# Patient Record
Sex: MALE | Race: WHITE | NOT HISPANIC OR LATINO | URBAN - METROPOLITAN AREA
[De-identification: names, ages, dates, MRNs, and addresses within clinical notes are randomized per-mention and may not be internally consistent; named-entity substitution may affect disease eponyms.]

---

## 2021-03-11 ENCOUNTER — CLAIMS CREATED FROM THE CLAIM WINDOW (OUTPATIENT)
Dept: URBAN - METROPOLITAN AREA CLINIC 92 | Facility: CLINIC | Age: 61
End: 2021-03-11
Payer: COMMERCIAL

## 2021-03-11 VITALS
WEIGHT: 172 LBS | DIASTOLIC BLOOD PRESSURE: 81 MMHG | BODY MASS INDEX: 25.48 KG/M2 | TEMPERATURE: 95.1 F | HEART RATE: 57 BPM | SYSTOLIC BLOOD PRESSURE: 134 MMHG | HEIGHT: 69 IN

## 2021-03-11 DIAGNOSIS — Z12.11 SCREEN FOR COLON CANCER: ICD-10-CM

## 2021-03-11 PROCEDURE — 99242 OFF/OP CONSLTJ NEW/EST SF 20: CPT | Performed by: INTERNAL MEDICINE

## 2021-03-11 PROCEDURE — 993 AGA: Performed by: INTERNAL MEDICINE

## 2021-03-11 RX ORDER — SODIUM, POTASSIUM,MAG SULFATES 17.5-3.13G
354ML SOLUTION, RECONSTITUTED, ORAL ORAL
Qty: 354 MILLILITER | Refills: 0 | OUTPATIENT
Start: 2021-03-10 | End: 2021-03-11

## 2021-03-11 NOTE — EXAM-PHYSICAL EXAM
HEENT: NC/AT, pupils nondilated, trachea midline Neck: supple, NT, no LAD Chest: Symmetrical, no labored respirations, no audible wheezing Heart: Regular rate and rhythm Abd: Soft NT/ND, no organomegaly, distention, masses Ext: no c/c/e Neuro: no focal defects, A and O

## 2021-03-11 NOTE — HPI-OTHER HISTORIES
Wt decr 24# over 10y (was 196)  Patient was referred by Dr. Menjivar for an evaluation of screening.  A copy of this note will be sent to the referring provider   Denies abd pain N/V diarrhea constipation hematochezia melena jaundice unintentional wt loss   Denies dyspepsia htbrn dysphagia odynophagia food impaction CP cough anorexia light headedness   Denies scleral icterus, increased abd girth, LE edema, confusion, disorientation, memory loss, hematemesis  Prior colon 10y ago Dr Chaudhry WNL

## 2021-12-08 ENCOUNTER — WEB ENCOUNTER (OUTPATIENT)
Dept: URBAN - METROPOLITAN AREA CLINIC 92 | Facility: CLINIC | Age: 61
End: 2021-12-08

## 2021-12-08 ENCOUNTER — OFFICE VISIT (OUTPATIENT)
Dept: URBAN - METROPOLITAN AREA CLINIC 92 | Facility: CLINIC | Age: 61
End: 2021-12-08
Payer: COMMERCIAL

## 2021-12-08 DIAGNOSIS — D69.6 THROMBOCYTOPENIA: ICD-10-CM

## 2021-12-08 DIAGNOSIS — R93.5 ABNORMAL MRI OF ABDOMEN: ICD-10-CM

## 2021-12-08 DIAGNOSIS — I85.00 ESOPHAGEAL VARICES WITHOUT BLEEDING, UNSPECIFIED ESOPHAGEAL VARICES TYPE: ICD-10-CM

## 2021-12-08 DIAGNOSIS — R19.5 OCCULT BLOOD POSITIVE STOOL: ICD-10-CM

## 2021-12-08 PROCEDURE — 99214 OFFICE O/P EST MOD 30 MIN: CPT | Performed by: PHYSICIAN ASSISTANT
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
## 2021-12-09 ENCOUNTER — OFFICE VISIT (OUTPATIENT)
Dept: URBAN - METROPOLITAN AREA CLINIC 86 | Facility: CLINIC | Age: 61
End: 2021-12-09
Payer: COMMERCIAL

## 2021-12-09 VITALS
TEMPERATURE: 97.5 F | HEART RATE: 70 BPM | SYSTOLIC BLOOD PRESSURE: 139 MMHG | HEIGHT: 69 IN | DIASTOLIC BLOOD PRESSURE: 87 MMHG | BODY MASS INDEX: 26 KG/M2 | WEIGHT: 175.5 LBS

## 2021-12-09 DIAGNOSIS — R93.5 ABNORMAL MRI OF ABDOMEN: ICD-10-CM

## 2021-12-09 DIAGNOSIS — R76.8 HEPATITIS B CORE ANTIBODY POSITIVE: ICD-10-CM

## 2021-12-09 DIAGNOSIS — D69.6 THROMBOCYTOPENIA: ICD-10-CM

## 2021-12-09 DIAGNOSIS — R74.8 ELEVATED LIVER ENZYMES: ICD-10-CM

## 2021-12-09 PROCEDURE — 99214 OFFICE O/P EST MOD 30 MIN: CPT | Performed by: PHYSICIAN ASSISTANT

## 2021-12-09 RX ORDER — VITAMIN A 2400 MCG
AS DIRECTED CAPSULE ORAL ONCE A DAY
Status: ACTIVE | COMMUNITY

## 2021-12-09 RX ORDER — OMEPRAZOLE 40 MG/1
1 CAPSULE 30 MINUTES BEFORE MORNING MEAL CAPSULE, DELAYED RELEASE ORAL ONCE A DAY
Status: ACTIVE | COMMUNITY

## 2021-12-09 RX ORDER — MELOXICAM 7.5 MG/1
1 TABLET TABLET ORAL ONCE A DAY
Status: ACTIVE | COMMUNITY

## 2021-12-09 RX ORDER — PREGABALIN 75 MG/1
1 CAPSULE CAPSULE ORAL TWICE A DAY
Status: ACTIVE | COMMUNITY

## 2021-12-09 RX ORDER — ACYCLOVIR 200 MG/1
1 CAPSULE CAPSULE ORAL TWICE A DAY
Status: ACTIVE | COMMUNITY

## 2021-12-09 RX ORDER — IMIQUIMOD 50 MG/G
1 APPLICATION AT BEDTIME, LEAVE ON FOR 8 HOURS THEN WASH OFF CREAM TOPICAL
Status: ACTIVE | COMMUNITY

## 2021-12-09 RX ORDER — PRAVASTATIN SODIUM 40 MG/1
1 TABLET TABLET ORAL ONCE A DAY
Status: ACTIVE | COMMUNITY

## 2021-12-09 RX ORDER — ATOVAQUONE 750 MG/5ML
5 ML WITH FOOD SUSPENSION ORAL DAILY
Status: ACTIVE | COMMUNITY

## 2021-12-09 RX ORDER — LOPERAMIDE HYDROCHLORIDE 2 MG/1
1 TABLET AS NEEDED CAPSULE ORAL
Status: ACTIVE | COMMUNITY

## 2021-12-09 RX ORDER — DEXTROSE 4 G
1 TABLET TABLET,CHEWABLE ORAL ONCE A DAY
Status: ACTIVE | COMMUNITY

## 2021-12-09 RX ORDER — DRONABINOL 2.5 MG/1
1 CAPSULE IN THE EVENING OR AT BEDTIME CAPSULE ORAL
Status: ACTIVE | COMMUNITY

## 2021-12-09 RX ORDER — BICTEGRAVIR SODIUM, EMTRICITABINE, AND TENOFOVIR ALAFENAMIDE FUMARATE 50; 200; 25 MG/1; MG/1; MG/1
1 TABLET TABLET ORAL ONCE A DAY
Status: ACTIVE | COMMUNITY

## 2021-12-09 RX ORDER — LORATADINE 10 MG/1
1 TABLET TABLET ORAL ONCE A DAY
Status: ACTIVE | COMMUNITY
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
## 2021-12-16 LAB
A/G RATIO: 1.6
ACTIN (SMOOTH MUSCLE) ANTIBODY: 33
ALBUMIN: 4.7
ALKALINE PHOSPHATASE: 78
ALPHA-1-ANTITRYPSIN, SERUM: 141
ALT (SGPT): 37
ANA DIRECT: NEGATIVE
AST (SGOT): 42
BASO (ABSOLUTE): 0
BASOS: 1
BILIRUBIN, TOTAL: 0.7
BUN/CREATININE RATIO: 24
BUN: 18
CALCIUM: 9.5
CARBON DIOXIDE, TOTAL: 25
CHLORIDE: 104
CREATININE: 0.75
EGFR IF AFRICN AM: 114
EGFR IF NONAFRICN AM: 99
EOS (ABSOLUTE): 0.2
EOS: 4
FERRITIN, SERUM: 73
GLOBULIN, TOTAL: 2.9
GLUCOSE: 86
HBSAG SCREEN: NEGATIVE
HBV LOG10: (no result)
HCV AB: <0.1
HEMATOCRIT: 45.6
HEMATOLOGY COMMENTS:: (no result)
HEMOGLOBIN: 15.4
HEP A AB, TOTAL: NEGATIVE
HEP B CORE AB, TOT: POSITIVE
HEP BE AB: POSITIVE
HEP BE AG: NEGATIVE
HEPATITIS B QUANTITATION: (no result)
HEPATITIS B SURF AB QUANT: 151
IMMATURE CELLS: (no result)
IMMATURE GRANS (ABS): 0
IMMATURE GRANULOCYTES: 0
INR: 1.1
INTERPRETATION:: (no result)
IRON BIND.CAP.(TIBC): 404
IRON SATURATION: 39
IRON: 158
LYMPHS (ABSOLUTE): 1.1
LYMPHS: 23
MCH: 30.7
MCHC: 33.8
MCV: 91
MITOCHONDRIAL (M2) ANTIBODY: <20
MONOCYTES(ABSOLUTE): 0.5
MONOCYTES: 10
NEUTROPHILS (ABSOLUTE): 2.9
NEUTROPHILS: 62
NRBC: (no result)
PHENOTYPE (PI): (no result)
PLATELETS: 77
POTASSIUM: 4.3
PROTEIN, TOTAL: 7.6
PROTHROMBIN TIME: 11.5
RBC: 5.01
RDW: 13
SODIUM: 142
TEST INFORMATION:: (no result)
UIBC: 246
WBC: 4.7

## 2021-12-20 ENCOUNTER — TELEPHONE ENCOUNTER (OUTPATIENT)
Dept: URBAN - METROPOLITAN AREA CLINIC 92 | Facility: CLINIC | Age: 61
End: 2021-12-20

## 2021-12-20 NOTE — HPI-TODAY'S VISIT:
labs 12/9/21- hepatitis c negative, iron labs are normal, ferritin 72 normal, hepatitis b surface antibody 151 (consistent with immunity to hepatits b), hepatitis be antigen negative, hepatitis be ab positive, asma 33 elevated (would like to check igg and igm for further evaluation, sometimes can be falsely elevated in setting of fatty liver), ama negative, alpha 1 141 with normal phenotype MM, glucose 86 normal, creatinine 0.75, sodium 142 normal, alkaline phosphatase 78 normal, AST 42 high, ALT 37 normal however ideal is less than 35, HEATHER negative, hepatitis b DNA not detected, wbc 4.7 normal, hemoglobin 15.4 normal, platelets 77 low however may be higher due to clumping of sample, inr 1.1, hepatitis b core ab total positive, hepatitis a ab total negative (recommend hepatitis a vaccine as you are not immune), hepatitis b surf ag negative

## 2021-12-26 LAB
IMMUNOGLOBULIN G, QN, SERUM: 1407
IMMUNOGLOBULIN M, QN, SERUM: 259

## 2022-01-17 ENCOUNTER — LAB OUTSIDE AN ENCOUNTER (OUTPATIENT)
Dept: URBAN - METROPOLITAN AREA CLINIC 92 | Facility: CLINIC | Age: 62
End: 2022-01-17

## 2022-01-18 LAB
BASO (ABSOLUTE): 0.1
BASOS: 1
EOS (ABSOLUTE): 0.2
EOS: 4
HEMATOCRIT: 45.5
HEMATOLOGY COMMENTS:: (no result)
HEMOGLOBIN: 16.5
IMMATURE CELLS: (no result)
IMMATURE GRANS (ABS): 0
IMMATURE GRANULOCYTES: 0
LYMPHS (ABSOLUTE): 1.2
LYMPHS: 25
MCH: 33.2
MCHC: 36.3
MCV: 92
MONOCYTES(ABSOLUTE): 0.4
MONOCYTES: 9
NEUTROPHILS (ABSOLUTE): 2.9
NEUTROPHILS: 61
NRBC: (no result)
PLATELETS: 72
RBC: 4.97
RDW: 13.4
WBC: 4.9

## 2022-01-25 ENCOUNTER — OFFICE VISIT (OUTPATIENT)
Dept: URBAN - METROPOLITAN AREA SURGERY CENTER 16 | Facility: SURGERY CENTER | Age: 62
End: 2022-01-25
Payer: COMMERCIAL

## 2022-01-25 DIAGNOSIS — I85.10 ESOPH VARICE OTHER DIS: ICD-10-CM

## 2022-01-25 DIAGNOSIS — K74.69 CIRRHOSIS, CRYPTOGENIC: ICD-10-CM

## 2022-01-25 DIAGNOSIS — K29.60 ADENOPAPILLOMATOSIS GASTRICA: ICD-10-CM

## 2022-01-25 DIAGNOSIS — K31.7 BENIGN GASTRIC POLYP: ICD-10-CM

## 2022-01-25 PROCEDURE — G8907 PT DOC NO EVENTS ON DISCHARG: HCPCS | Performed by: INTERNAL MEDICINE

## 2022-01-25 PROCEDURE — G0121 COLON CA SCRN NOT HI RSK IND: HCPCS | Performed by: INTERNAL MEDICINE

## 2022-01-25 PROCEDURE — 43239 EGD BIOPSY SINGLE/MULTIPLE: CPT | Performed by: INTERNAL MEDICINE

## 2022-01-25 RX ORDER — PREGABALIN 75 MG/1
1 CAPSULE CAPSULE ORAL TWICE A DAY
Status: ACTIVE | COMMUNITY

## 2022-01-25 RX ORDER — IMIQUIMOD 50 MG/G
1 APPLICATION AT BEDTIME, LEAVE ON FOR 8 HOURS THEN WASH OFF CREAM TOPICAL
Status: ACTIVE | COMMUNITY

## 2022-01-25 RX ORDER — LOPERAMIDE HYDROCHLORIDE 2 MG/1
1 TABLET AS NEEDED CAPSULE ORAL
Status: ACTIVE | COMMUNITY

## 2022-01-25 RX ORDER — ACYCLOVIR 200 MG/1
1 CAPSULE CAPSULE ORAL TWICE A DAY
Status: ACTIVE | COMMUNITY

## 2022-01-25 RX ORDER — MELOXICAM 7.5 MG/1
1 TABLET TABLET ORAL ONCE A DAY
Status: ACTIVE | COMMUNITY

## 2022-01-25 RX ORDER — DEXTROSE 4 G
1 TABLET TABLET,CHEWABLE ORAL ONCE A DAY
Status: ACTIVE | COMMUNITY

## 2022-01-25 RX ORDER — PRAVASTATIN SODIUM 40 MG/1
1 TABLET TABLET ORAL ONCE A DAY
Status: ACTIVE | COMMUNITY

## 2022-01-25 RX ORDER — VITAMIN A 2400 MCG
AS DIRECTED CAPSULE ORAL ONCE A DAY
Status: ACTIVE | COMMUNITY

## 2022-01-25 RX ORDER — DRONABINOL 2.5 MG/1
1 CAPSULE IN THE EVENING OR AT BEDTIME CAPSULE ORAL
Status: ACTIVE | COMMUNITY

## 2022-01-25 RX ORDER — BICTEGRAVIR SODIUM, EMTRICITABINE, AND TENOFOVIR ALAFENAMIDE FUMARATE 50; 200; 25 MG/1; MG/1; MG/1
1 TABLET TABLET ORAL ONCE A DAY
Status: ACTIVE | COMMUNITY

## 2022-01-25 RX ORDER — ATOVAQUONE 750 MG/5ML
5 ML WITH FOOD SUSPENSION ORAL DAILY
Status: ACTIVE | COMMUNITY

## 2022-01-25 RX ORDER — LORATADINE 10 MG/1
1 TABLET TABLET ORAL ONCE A DAY
Status: ACTIVE | COMMUNITY

## 2022-01-25 RX ORDER — OMEPRAZOLE 40 MG/1
1 CAPSULE 30 MINUTES BEFORE MORNING MEAL CAPSULE, DELAYED RELEASE ORAL ONCE A DAY
Status: ACTIVE | COMMUNITY

## 2022-01-27 ENCOUNTER — OFFICE VISIT (OUTPATIENT)
Dept: URBAN - METROPOLITAN AREA TELEHEALTH 2 | Facility: TELEHEALTH | Age: 62
End: 2022-01-27
Payer: COMMERCIAL

## 2022-01-27 VITALS — BODY MASS INDEX: 25.18 KG/M2 | WEIGHT: 170 LBS | HEIGHT: 69 IN

## 2022-01-27 DIAGNOSIS — R93.5 ABNORMAL MRI OF ABDOMEN: ICD-10-CM

## 2022-01-27 DIAGNOSIS — R74.8 ELEVATED LIVER ENZYMES: ICD-10-CM

## 2022-01-27 DIAGNOSIS — K76.6 PORTAL HYPERTENSION: ICD-10-CM

## 2022-01-27 DIAGNOSIS — K74.60 CIRRHOSIS OF LIVER WITHOUT ASCITES, UNSPECIFIED HEPATIC CIRRHOSIS TYPE: ICD-10-CM

## 2022-01-27 PROCEDURE — 99214 OFFICE O/P EST MOD 30 MIN: CPT | Performed by: PHYSICIAN ASSISTANT

## 2022-01-27 RX ORDER — ACYCLOVIR 200 MG/1
1 CAPSULE CAPSULE ORAL TWICE A DAY
Status: ACTIVE | COMMUNITY

## 2022-01-27 RX ORDER — PRAVASTATIN SODIUM 40 MG/1
1 TABLET TABLET ORAL ONCE A DAY
Status: ACTIVE | COMMUNITY

## 2022-01-27 RX ORDER — VITAMIN A 2400 MCG
AS DIRECTED CAPSULE ORAL ONCE A DAY
Status: ACTIVE | COMMUNITY

## 2022-01-27 RX ORDER — LORATADINE 10 MG/1
1 TABLET TABLET ORAL ONCE A DAY
Status: ACTIVE | COMMUNITY

## 2022-01-27 RX ORDER — DRONABINOL 2.5 MG/1
1 CAPSULE IN THE EVENING OR AT BEDTIME CAPSULE ORAL
Status: ACTIVE | COMMUNITY

## 2022-01-27 RX ORDER — DEXTROSE 4 G
1 TABLET TABLET,CHEWABLE ORAL ONCE A DAY
Status: ACTIVE | COMMUNITY

## 2022-01-27 RX ORDER — BICTEGRAVIR SODIUM, EMTRICITABINE, AND TENOFOVIR ALAFENAMIDE FUMARATE 50; 200; 25 MG/1; MG/1; MG/1
1 TABLET TABLET ORAL ONCE A DAY
Status: ACTIVE | COMMUNITY

## 2022-01-27 RX ORDER — LOPERAMIDE HYDROCHLORIDE 2 MG/1
1 TABLET AS NEEDED CAPSULE ORAL
Status: ACTIVE | COMMUNITY

## 2022-01-27 RX ORDER — OMEPRAZOLE 40 MG/1
1 CAPSULE 30 MINUTES BEFORE MORNING MEAL CAPSULE, DELAYED RELEASE ORAL ONCE A DAY
Status: ACTIVE | COMMUNITY

## 2022-01-27 RX ORDER — MELOXICAM 7.5 MG/1
1 TABLET TABLET ORAL ONCE A DAY
Status: ACTIVE | COMMUNITY

## 2022-01-27 RX ORDER — PREGABALIN 75 MG/1
1 CAPSULE CAPSULE ORAL TWICE A DAY
Status: ACTIVE | COMMUNITY

## 2022-01-27 RX ORDER — IMIQUIMOD 50 MG/G
1 APPLICATION AT BEDTIME, LEAVE ON FOR 8 HOURS THEN WASH OFF CREAM TOPICAL
Status: ACTIVE | COMMUNITY

## 2022-01-27 RX ORDER — ATOVAQUONE 750 MG/5ML
5 ML WITH FOOD SUSPENSION ORAL DAILY
Status: ACTIVE | COMMUNITY
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
## 2022-02-22 ENCOUNTER — TELEPHONE ENCOUNTER (OUTPATIENT)
Dept: URBAN - METROPOLITAN AREA CLINIC 92 | Facility: CLINIC | Age: 62
End: 2022-02-22

## 2022-03-08 LAB
ALBUMIN: 4.3
ALKALINE PHOSPHATASE: 91
ALT (SGPT): 42
AST (SGOT): 55
BILIRUBIN, DIRECT: 0.17
BILIRUBIN, TOTAL: 0.6
PROTEIN, TOTAL: 7.3

## 2022-03-10 ENCOUNTER — LAB OUTSIDE AN ENCOUNTER (OUTPATIENT)
Dept: URBAN - METROPOLITAN AREA TELEHEALTH 2 | Facility: TELEHEALTH | Age: 62
End: 2022-03-10

## 2022-03-29 ENCOUNTER — TELEPHONE ENCOUNTER (OUTPATIENT)
Dept: URBAN - METROPOLITAN AREA CLINIC 92 | Facility: CLINIC | Age: 62
End: 2022-03-29

## 2022-03-29 LAB
A/G RATIO: 1.4
ALBUMIN: 4.3
ALKALINE PHOSPHATASE: 83
ALT (SGPT): 48
AST (SGOT): 46
BASO (ABSOLUTE): 0
BASOS: 1
BILIRUBIN, TOTAL: 0.7
BUN/CREATININE RATIO: 22
BUN: 16
CALCIUM: 9.3
CARBON DIOXIDE, TOTAL: 25
CHLORIDE: 100
CREATININE: 0.73
EGFR: 104
EOS (ABSOLUTE): 0.2
EOS: 3
GLOBULIN, TOTAL: 3
GLUCOSE: 91
HEMATOCRIT: 47.3
HEMATOLOGY COMMENTS:: (no result)
HEMOGLOBIN: 15.7
IMMATURE CELLS: (no result)
IMMATURE GRANS (ABS): 0
IMMATURE GRANULOCYTES: 0
IMMUNOGLOBULIN M, QN, SERUM: 247
INR: 1.1
LYMPHS (ABSOLUTE): 0.9
LYMPHS: 20
MCH: 31.2
MCHC: 33.2
MCV: 94
MONOCYTES(ABSOLUTE): 0.5
MONOCYTES: 11
NEUTROPHILS (ABSOLUTE): 2.8
NEUTROPHILS: 65
NRBC: (no result)
PLATELETS: 79
POTASSIUM: 4.5
PROTEIN, TOTAL: 7.3
PROTHROMBIN TIME: 11.6
RBC: 5.03
RDW: 13.4
SODIUM: 138
WBC: 4.4

## 2022-04-11 ENCOUNTER — OFFICE VISIT (OUTPATIENT)
Dept: URBAN - METROPOLITAN AREA MEDICAL CENTER 28 | Facility: MEDICAL CENTER | Age: 62
End: 2022-04-11
Payer: COMMERCIAL

## 2022-04-11 DIAGNOSIS — K31.89 ACQUIRED DEFORMITY OF DUODENUM: ICD-10-CM

## 2022-04-11 DIAGNOSIS — K31.7 BENIGN GASTRIC POLYP: ICD-10-CM

## 2022-04-11 DIAGNOSIS — I85.00 ESOPHAGEAL  VARICOSE VEINS: ICD-10-CM

## 2022-04-11 PROCEDURE — 43251 EGD REMOVE LESION SNARE: CPT | Performed by: INTERNAL MEDICINE

## 2022-04-11 RX ORDER — VITAMIN A 2400 MCG
AS DIRECTED CAPSULE ORAL ONCE A DAY
Status: ACTIVE | COMMUNITY

## 2022-04-11 RX ORDER — ATOVAQUONE 750 MG/5ML
5 ML WITH FOOD SUSPENSION ORAL DAILY
Status: ACTIVE | COMMUNITY

## 2022-04-11 RX ORDER — BICTEGRAVIR SODIUM, EMTRICITABINE, AND TENOFOVIR ALAFENAMIDE FUMARATE 50; 200; 25 MG/1; MG/1; MG/1
1 TABLET TABLET ORAL ONCE A DAY
Status: ACTIVE | COMMUNITY

## 2022-04-11 RX ORDER — LOPERAMIDE HYDROCHLORIDE 2 MG/1
1 TABLET AS NEEDED CAPSULE ORAL
Status: ACTIVE | COMMUNITY

## 2022-04-11 RX ORDER — DEXTROSE 4 G
1 TABLET TABLET,CHEWABLE ORAL ONCE A DAY
Status: ACTIVE | COMMUNITY

## 2022-04-11 RX ORDER — MELOXICAM 7.5 MG/1
1 TABLET TABLET ORAL ONCE A DAY
Status: ACTIVE | COMMUNITY

## 2022-04-11 RX ORDER — PREGABALIN 75 MG/1
1 CAPSULE CAPSULE ORAL TWICE A DAY
Status: ACTIVE | COMMUNITY

## 2022-04-11 RX ORDER — ACYCLOVIR 200 MG/1
1 CAPSULE CAPSULE ORAL TWICE A DAY
Status: ACTIVE | COMMUNITY

## 2022-04-11 RX ORDER — PRAVASTATIN SODIUM 40 MG/1
1 TABLET TABLET ORAL ONCE A DAY
Status: ACTIVE | COMMUNITY

## 2022-04-11 RX ORDER — LORATADINE 10 MG/1
1 TABLET TABLET ORAL ONCE A DAY
Status: ACTIVE | COMMUNITY

## 2022-04-11 RX ORDER — OMEPRAZOLE 40 MG/1
1 CAPSULE 30 MINUTES BEFORE MORNING MEAL CAPSULE, DELAYED RELEASE ORAL ONCE A DAY
Status: ACTIVE | COMMUNITY

## 2022-04-11 RX ORDER — IMIQUIMOD 50 MG/G
1 APPLICATION AT BEDTIME, LEAVE ON FOR 8 HOURS THEN WASH OFF CREAM TOPICAL
Status: ACTIVE | COMMUNITY

## 2022-04-11 RX ORDER — DRONABINOL 2.5 MG/1
1 CAPSULE IN THE EVENING OR AT BEDTIME CAPSULE ORAL
Status: ACTIVE | COMMUNITY

## 2022-04-25 ENCOUNTER — OFFICE VISIT (OUTPATIENT)
Dept: URBAN - METROPOLITAN AREA TELEHEALTH 2 | Facility: TELEHEALTH | Age: 62
End: 2022-04-25
Payer: COMMERCIAL

## 2022-04-25 DIAGNOSIS — B20 HIV INFECTION, UNSPECIFIED SYMPTOM STATUS: ICD-10-CM

## 2022-04-25 DIAGNOSIS — F32.A DEPRESSION, UNSPECIFIED: ICD-10-CM

## 2022-04-25 DIAGNOSIS — K74.69 OTHER CIRRHOSIS OF LIVER: ICD-10-CM

## 2022-04-25 DIAGNOSIS — M81.0 OSTEOPOROSIS WITHOUT CURRENT PATHOLOGICAL FRACTURE, UNSPECIFIED OSTEOPOROSIS TYPE: ICD-10-CM

## 2022-04-25 DIAGNOSIS — F41.9 ANXIETY DISORDER, UNSPECIFIED: ICD-10-CM

## 2022-04-25 DIAGNOSIS — R19.5 OCCULT BLOOD POSITIVE STOOL: ICD-10-CM

## 2022-04-25 DIAGNOSIS — K76.6 PORTAL HYPERTENSION: ICD-10-CM

## 2022-04-25 DIAGNOSIS — D69.6 THROMBOCYTOPENIA: ICD-10-CM

## 2022-04-25 DIAGNOSIS — R93.5 ABNORMAL MRI OF ABDOMEN: ICD-10-CM

## 2022-04-25 DIAGNOSIS — E78.2 MIXED HYPERLIPIDEMIA: ICD-10-CM

## 2022-04-25 DIAGNOSIS — I85.10 SECONDARY ESOPHAGEAL VARICES WITHOUT BLEEDING: ICD-10-CM

## 2022-04-25 PROCEDURE — 99214 OFFICE O/P EST MOD 30 MIN: CPT

## 2022-04-25 RX ORDER — VITAMIN A 2400 MCG
AS DIRECTED CAPSULE ORAL ONCE A DAY
Status: ACTIVE | COMMUNITY

## 2022-04-25 RX ORDER — IMIQUIMOD 50 MG/G
1 APPLICATION AT BEDTIME, LEAVE ON FOR 8 HOURS THEN WASH OFF CREAM TOPICAL
Status: ACTIVE | COMMUNITY

## 2022-04-25 RX ORDER — PRAVASTATIN SODIUM 40 MG/1
1 TABLET TABLET ORAL ONCE A DAY
Status: ACTIVE | COMMUNITY

## 2022-04-25 RX ORDER — DEXTROSE 4 G
1 TABLET TABLET,CHEWABLE ORAL ONCE A DAY
Status: ACTIVE | COMMUNITY

## 2022-04-25 RX ORDER — ACYCLOVIR 200 MG/1
1 CAPSULE CAPSULE ORAL TWICE A DAY
Status: ACTIVE | COMMUNITY

## 2022-04-25 RX ORDER — OMEPRAZOLE 40 MG/1
1 CAPSULE 30 MINUTES BEFORE MORNING MEAL CAPSULE, DELAYED RELEASE ORAL ONCE A DAY
Status: ACTIVE | COMMUNITY

## 2022-04-25 RX ORDER — DRONABINOL 2.5 MG/1
1 CAPSULE IN THE EVENING OR AT BEDTIME CAPSULE ORAL
Status: ACTIVE | COMMUNITY

## 2022-04-25 RX ORDER — ATOVAQUONE 750 MG/5ML
5 ML WITH FOOD SUSPENSION ORAL DAILY
Status: DISCONTINUED | COMMUNITY

## 2022-04-25 RX ORDER — LOPERAMIDE HYDROCHLORIDE 2 MG/1
1 TABLET AS NEEDED CAPSULE ORAL
Status: ACTIVE | COMMUNITY

## 2022-04-25 RX ORDER — PREGABALIN 75 MG/1
1 CAPSULE CAPSULE ORAL TWICE A DAY
Status: ACTIVE | COMMUNITY

## 2022-04-25 RX ORDER — BICTEGRAVIR SODIUM, EMTRICITABINE, AND TENOFOVIR ALAFENAMIDE FUMARATE 50; 200; 25 MG/1; MG/1; MG/1
1 TABLET TABLET ORAL ONCE A DAY
Status: ACTIVE | COMMUNITY

## 2022-04-25 RX ORDER — LORATADINE 10 MG/1
1 TABLET TABLET ORAL ONCE A DAY
Status: ACTIVE | COMMUNITY

## 2022-04-25 RX ORDER — MELOXICAM 7.5 MG/1
1 TABLET TABLET ORAL ONCE A DAY
Status: DISCONTINUED | COMMUNITY
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
## 2022-04-27 ENCOUNTER — LAB OUTSIDE AN ENCOUNTER (OUTPATIENT)
Dept: URBAN - METROPOLITAN AREA TELEHEALTH 2 | Facility: TELEHEALTH | Age: 62
End: 2022-04-27

## 2022-06-01 ENCOUNTER — LAB OUTSIDE AN ENCOUNTER (OUTPATIENT)
Dept: URBAN - METROPOLITAN AREA TELEHEALTH 2 | Facility: TELEHEALTH | Age: 62
End: 2022-06-01

## 2022-06-02 ENCOUNTER — TELEPHONE ENCOUNTER (OUTPATIENT)
Dept: URBAN - METROPOLITAN AREA CLINIC 92 | Facility: CLINIC | Age: 62
End: 2022-06-02

## 2022-06-02 ENCOUNTER — WEB ENCOUNTER (OUTPATIENT)
Dept: URBAN - METROPOLITAN AREA CLINIC 86 | Facility: CLINIC | Age: 62
End: 2022-06-02

## 2022-06-02 ENCOUNTER — OFFICE VISIT (OUTPATIENT)
Dept: URBAN - METROPOLITAN AREA TELEHEALTH 2 | Facility: TELEHEALTH | Age: 62
End: 2022-06-02

## 2022-06-02 LAB
A/G RATIO: 1.4
ALBUMIN: 4.3
ALKALINE PHOSPHATASE: 109
ALT (SGPT): 41
AST (SGOT): 44
BASO (ABSOLUTE): (no result)
BASOS: (no result)
BILIRUBIN, TOTAL: 0.4
BUN/CREATININE RATIO: 13
BUN: 10
CALCIUM: 9.2
CARBON DIOXIDE, TOTAL: 26
CHLORIDE: 100
CREATININE: 0.77
EGFR: 102
EOS (ABSOLUTE): (no result)
EOS: (no result)
GLOBULIN, TOTAL: 3
GLUCOSE: 117
HEMATOCRIT: (no result)
HEMATOLOGY COMMENTS:: (no result)
HEMOGLOBIN: (no result)
IMMATURE CELLS: (no result)
IMMATURE GRANS (ABS): (no result)
IMMATURE GRANULOCYTES: (no result)
INR: 1.1
LYMPHS (ABSOLUTE): (no result)
LYMPHS: (no result)
MCH: (no result)
MCHC: (no result)
MCV: (no result)
MONOCYTES(ABSOLUTE): (no result)
MONOCYTES: (no result)
NEUTROPHILS (ABSOLUTE): (no result)
NEUTROPHILS: (no result)
NRBC: (no result)
PLATELETS: (no result)
POTASSIUM: 5
PROTEIN, TOTAL: 7.3
PROTHROMBIN TIME: 11.4
RBC: (no result)
RDW: (no result)
REQUEST PROBLEM: (no result)
SODIUM: 140
WBC: (no result)

## 2022-06-02 NOTE — HPI-TODAY'S VISIT:
Dear Seth Wilkerson, June 2022 labs show that the CBC was clumped and so not able to be run. The CMP was able to be run and showed that your sugar was up at 117 and maybe your were not fasting?  Please share with primary provider. BUN of 10 creatinine 0.77 sodium 140 potassium 5.0 albumin 4.3 bilirubin 0.4 alkaline phosphatase 109 AST 44 and ALT 41.  Previously AST 46 and ALT 48 so a little lower but ideal ALT is less than 35 so still a bit off. INR 1.1 which is stable. Dr Dominguez

## 2022-06-03 ENCOUNTER — LAB OUTSIDE AN ENCOUNTER (OUTPATIENT)
Dept: URBAN - METROPOLITAN AREA CLINIC 86 | Facility: CLINIC | Age: 62
End: 2022-06-03

## 2022-06-06 ENCOUNTER — TELEPHONE ENCOUNTER (OUTPATIENT)
Dept: URBAN - METROPOLITAN AREA CLINIC 92 | Facility: CLINIC | Age: 62
End: 2022-06-06

## 2022-06-06 ENCOUNTER — LAB OUTSIDE AN ENCOUNTER (OUTPATIENT)
Dept: URBAN - METROPOLITAN AREA TELEHEALTH 2 | Facility: TELEHEALTH | Age: 62
End: 2022-06-06

## 2022-06-06 NOTE — HPI-TODAY'S VISIT:
Tricia Wilkerson, June 6 ultrasound shows liver to be mildly coarsened with regards to the echotexture but with no lesions. Bile duct showed no dilated bile ducts and common duct was 3 mm. Gallbladder was normal. Pancreas was obscured by overlying structures. Right kidney 12.3 cm and left kidney 13.6 cm.  No hydronephrosis. Spleen enlarged at 16.7 cm. Liver vessels patent. Prior scan was an MRI from November 30 of last year that showed that the liver had mild morphologic changes of chronic liver disease with fissural widening and minimal nodular contour and no suspicious lesions.  Spleen was enlarged and also at 16 cm.  Small esophageal and perigastric varices were seen. This reading would be generally in keeping with that reports findings. Dr Dominguez

## 2022-06-06 NOTE — HPI-TODAY'S VISIT:
Dear Seth Wilkerson, June 6 [place] labs sent in: na 140 and k 4.8 and cl 101 and co2 29 and glu 203 elevated and please discuss with local provider. TB 0.7 and alb 4.2 and ca 9.2. cr 0.72. Ast 37 and alt 38 (ideal alt less than 35). Alk 86.   Wbc 3.6 little low and hct 46.3 and plat 72 low. Prior other labs: cmp showed glucose 117 also up. Ast 44 and alt 41 so little lower now. Cbc not run and inr 1.1. Meld using the labs was 7 and meld na 7 so remains low. Dr Dominguez

## 2022-07-22 ENCOUNTER — OFFICE VISIT (OUTPATIENT)
Dept: URBAN - METROPOLITAN AREA TELEHEALTH 2 | Facility: TELEHEALTH | Age: 62
End: 2022-07-22
Payer: COMMERCIAL

## 2022-07-22 VITALS — WEIGHT: 175 LBS | HEIGHT: 69 IN | BODY MASS INDEX: 25.92 KG/M2

## 2022-07-22 DIAGNOSIS — K76.6 PORTAL HYPERTENSION: ICD-10-CM

## 2022-07-22 DIAGNOSIS — R93.5 ABNORMAL MRI OF ABDOMEN: ICD-10-CM

## 2022-07-22 DIAGNOSIS — E78.2 MIXED HYPERLIPIDEMIA: ICD-10-CM

## 2022-07-22 DIAGNOSIS — I85.00 ESOPHAGEAL VARICES WITHOUT BLEEDING, UNSPECIFIED ESOPHAGEAL VARICES TYPE: ICD-10-CM

## 2022-07-22 DIAGNOSIS — R19.5 OCCULT BLOOD POSITIVE STOOL: ICD-10-CM

## 2022-07-22 DIAGNOSIS — F41.9 ANXIETY DISORDER, UNSPECIFIED: ICD-10-CM

## 2022-07-22 DIAGNOSIS — B20 HIV INFECTION, UNSPECIFIED SYMPTOM STATUS: ICD-10-CM

## 2022-07-22 DIAGNOSIS — R74.8 ELEVATED LIVER ENZYMES: ICD-10-CM

## 2022-07-22 DIAGNOSIS — K74.69 CIRRHOSIS, CRYPTOGENIC: ICD-10-CM

## 2022-07-22 DIAGNOSIS — D69.6 THROMBOCYTOPENIA: ICD-10-CM

## 2022-07-22 DIAGNOSIS — M81.0 OSTEOPOROSIS WITHOUT CURRENT PATHOLOGICAL FRACTURE, UNSPECIFIED OSTEOPOROSIS TYPE: ICD-10-CM

## 2022-07-22 DIAGNOSIS — R76.8 HEPATITIS B CORE ANTIBODY POSITIVE: ICD-10-CM

## 2022-07-22 PROCEDURE — 99214 OFFICE O/P EST MOD 30 MIN: CPT

## 2022-07-22 RX ORDER — OMEPRAZOLE 40 MG/1
1 CAPSULE 30 MINUTES BEFORE MORNING MEAL CAPSULE, DELAYED RELEASE ORAL ONCE A DAY
Status: ACTIVE | COMMUNITY

## 2022-07-22 RX ORDER — BICTEGRAVIR SODIUM, EMTRICITABINE, AND TENOFOVIR ALAFENAMIDE FUMARATE 50; 200; 25 MG/1; MG/1; MG/1
1 TABLET TABLET ORAL ONCE A DAY
Status: ACTIVE | COMMUNITY

## 2022-07-22 RX ORDER — DEXTROSE 4 G
1 TABLET TABLET,CHEWABLE ORAL ONCE A DAY
Status: ACTIVE | COMMUNITY

## 2022-07-22 RX ORDER — LORATADINE 10 MG/1
1 TABLET TABLET ORAL ONCE A DAY
Status: ACTIVE | COMMUNITY

## 2022-07-22 RX ORDER — LOPERAMIDE HYDROCHLORIDE 2 MG/1
1 TABLET AS NEEDED CAPSULE ORAL
Status: ACTIVE | COMMUNITY

## 2022-07-22 RX ORDER — VITAMIN A 2400 MCG
AS DIRECTED CAPSULE ORAL ONCE A DAY
Status: ACTIVE | COMMUNITY

## 2022-07-22 RX ORDER — DRONABINOL 2.5 MG/1
1 CAPSULE IN THE EVENING OR AT BEDTIME CAPSULE ORAL
Status: ACTIVE | COMMUNITY

## 2022-07-22 RX ORDER — PREGABALIN 75 MG/1
1 CAPSULE CAPSULE ORAL TWICE A DAY
Status: ACTIVE | COMMUNITY

## 2022-07-22 RX ORDER — ACYCLOVIR 200 MG/1
1 CAPSULE CAPSULE ORAL TWICE A DAY
Status: ACTIVE | COMMUNITY

## 2022-07-22 RX ORDER — PRAVASTATIN SODIUM 40 MG/1
1 TABLET TABLET ORAL ONCE A DAY
Status: ACTIVE | COMMUNITY

## 2022-07-22 RX ORDER — IMIQUIMOD 50 MG/G
1 APPLICATION AT BEDTIME, LEAVE ON FOR 8 HOURS THEN WASH OFF CREAM TOPICAL
Status: ACTIVE | COMMUNITY

## 2022-07-22 NOTE — EXAM-PHYSICAL EXAM
Telemed self reported:  Gen: no distress. Eyes: no jaundice. Mouth: no thrush. CV: no chest pain. Resp: no wheezes. Abd: no pain. Ext: no edema.	 Neuro: no weakness. Have Your Skin Lesions Been Treated?: not been treated Is This A New Presentation, Or A Follow-Up?: Skin Lesions

## 2022-07-22 NOTE — HPI-TODAY'S VISIT:
This is a 61 year old male referred  by Viry Serrano PA-C and last seen Jan 2022 by Ms Ruelas LAURENT for an evaluation of abnormal MRI with suspected cirrhosis/portal htn.    A copy of this note will be sent to the referring provider.  June 6 [facility] labs sent in: na 140 and k 4.8 and cl 101 and co2 29 and glu 203 elevated and please discuss with local provider.  TB 0.7 and alb 4.2 and ca 9.2. cr 0.72. Ast 37 and alt 38 (ideal alt less than 35). Alk 86.   Wbc 3.6 little low and hct 46.3 and plat 72 low. Prior other labs: cmp showed glucose 117 also up. Ast 44 and alt 41 so little lower now. Cbc not run and inr 1.1. Meld using the labs was 7 and meld na 7 so remains low.  Pt says he has high sugar and being watched.  June 2022 labs show that the CBC was clumped and so not able to be run.  June 6 ultrasound shows liver to be mildly coarsened with regards to the echotexture but with no lesions. Bile duct showed no dilated bile ducts and common duct was 3 mm. Gallbladder was normal. Pancreas was obscured by overlying structures. Right kidney 12.3 cm and left kidney 13.6 cm.  No hydronephrosis. Spleen enlarged at 16.7 cm. Liver vessels patent. Prior scan was an MRI from November 30 of last year that showed that the liver had mild morphologic changes of chronic liver disease with fissural widening and minimal nodular contour and no suspicious lesions.  Spleen was enlarged and also at 16 cm.  Small esophageal and perigastric varices were seen.   March 28, 2022 labs show IgM elevated slightly up to 247 with normal from 20 up to 172.  INR 1.1.  White blood cell count 4.4 hemoglobin 15.7 plate count 79 MCV 94 neutrophils 2.8 lymphocytes 0.9 glucose 91 BUN is 16 creatinine 0.73 sodium 138 potassium 4.5 albumin 4.3 bilirubin 0.7 alkaline phosphatase 83 AST 46 and ALT 48. Meld 7  and meld na 7.  He did the covid 19 booster last year he thinks 10-7-21 and has not done the 2nd booster.  January 17, 2022 labs show white blood cell count 4.9 hemoglobin 16.5 hematocrit 45.5 platelet count low at 72.  MCV normal at 92.  Neutrophils 2.9 lymphocytes 1.2.  EGD done by  on April 11, 2022 at [facility] shows 2 columns of grade 1 varices and one Grade 2 varices in the lower third of the esophagus.  Single 7 mm sessile polyp with no bleeding and no stigmata of recent bleeding seen at the cardia. It was removed with hot snare.  It was retrieved.  Two  6 to 10 mm sessile polyps with no bleeding seen on the lesser curvature of the stomach and removed with hot snare.  Mild inflammation characterized by the erythema and granularity seen of the gastric antrum duodenum normal.  Pathology of this came back as gastric polyps x3 with cauterized gastric mucosa with regenerative epithelial changes and cystic dilation with suggestive hyperplastic polyps.   He is planning a scope in april 2023.   January 25, 2022 EGD showed 2 columns grade 1 varices in 1: Grade 2 varices and diffuse moderate inflammation characterized by congestion erythema granularity of the gastric antrum.  Multiple 2 to 10 mm polyp seen with no bleeding.  January 25, 2022 colonoscopy showed entire colon was normal with nonbleeding rectal varices.  A single subcentimeter condyloma was seen.  Internal hemorrhoids not bleeding was seen at the endoscopy.  There were small in nature.  Patient was to be referred to colorectal surgeon.  He did see april 19 the rectal surgeon and they did a treatment for a condyloma.  November 30, 2021 MRI showed at [facility] lower thorax normal.  Liver with no fat or iron but with mild morphologic changes chronic liver disease with fissural widening and minimal nodular contour and no suspicious lesions.  Splenomegaly to 16 cm seen.  Gallbladder/biliary tree normal.  Pancreas normal.  Kidneys normal.  Liver vessels patent.  Small esophageal and perigastric varices seen.    PT Jan 2022 seen by Ms Jessenia MANCILLA for TH.  he is on mvi gummies which has 2000% biotin in it. would recommend he avoid. Also on mobic for a year or so, recommend he avoid this as well.   labs 1/3/22: na 138 alt 41 ast 42 alp 77tb 0.7 wbc 4.5 hgb 16.2 plt low 75      RECAP: He has a hx of HIV diagnosed in 1986- treatment initiated in 1996 and follows with Jeffery Hawk PA-C. and Dr Matthew.  Currently on Biktarvy since 01/2020.  Prezcobix was recently discontinued about 2 months ago as concern this was contributing to thrombocytopenia.   He has been seeing hematology (Dr. Yvette Hughes) for Thrombocytopenia: ranges from 139K in 1/28/2013 to 114K in 1/2018 when stabilized and dropped to the 90's in February 2020. Concern of drug related thrombocytopenia with Biktarvy and Prezcobix. CD4 is below 200 and so on Atovaquone (Mepron) for PCP prophylaxis.    MRI 12/2/2021 Liver: No fat or iron. Mild morphologic changes of chronic liver disease with fissural widening and minimal nodular contour. No suspicious lesions.   Patent portal vein, with sequela of portal hypertension, including splenomegaly and small upper abdominal varices.  No personal hx of fatty liver, hepatitis, or other liver disease.  No family hx of liver disease.  Records indicate hep c and hep b negative 2014.  Hep B core+ as of 11/2021 but otherwise hepatitis work up negative.  LFTs in [facility] reviewed from 11/2021 and WNL (alt 39, alp 76, ast 37) as are his Fe+ studies. Plt 85 11/15/21.   INR 1.08 10/2021.    Hx of hld currently on pravastatin 2017.  T  No hx of DM, last a1c 5.4%.     Weight has been fairly stable at about 170-175 over past 6 years, was up to max of 210 in late 1990s.  Summer 2022 175.  No alcohol use.    Marijuana use daily.  Some reports of this causing inc lfts and inc fibrosis.  Denies other drug or tobacco use.  Denies use of otc herbals/supplements.  He has a hx of osteoporosis with last DEXA November 2021 improved, s/p 3 Reclast infusions, follows with Dr. Namita Long endocrinology  Seem by Viry Serrano PA-C, 12/8/21 for fecal occult +. 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 Denies melena, hematochezia, N/V. Notes abdominal gurgling for years with ARVs without triggers. No anorexia or weight loss. Denies GERD and dysphagia.  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 No prior EGD.  Upcoming EGD/colonoscopy scheduled 1/25/22 with Dr. Chester.    Plan: 1. Plan u.s in dec. 2. Plan for labs and the meld score. 3. He will do egd with Dr Chester in april 2023. 4. Monitor sugars. 5. He will look into the high sugar with primary md.   Stressed to pt the need for social distancing and strict handwashing and wearing a mask and to follow any other new or added CDC recommendations as this is an evolving target.  Duration of the visit was 30 minutes with 10 minutes of chart prep and 20 minutes by healow video for this TeleMed visit with time reviewing their prior and recent records and labs and discussing their current status and future plans for care for the patient.

## 2022-10-05 ENCOUNTER — TELEPHONE ENCOUNTER (OUTPATIENT)
Dept: URBAN - METROPOLITAN AREA CLINIC 92 | Facility: CLINIC | Age: 62
End: 2022-10-05

## 2022-10-05 PROBLEM — 255417007 CIRRHOTIC: Status: ACTIVE | Noted: 2022-04-25

## 2022-10-05 PROBLEM — 78809005: Status: ACTIVE | Noted: 2022-02-22

## 2022-10-05 PROBLEM — 28670008 ESOPHAGEAL VARICES: Status: ACTIVE | Noted: 2022-04-25

## 2022-10-05 NOTE — HPI-TODAY'S VISIT:
Dr Menjivar, He just did an ultrasound June 6 with coarsened liver, no lesions, normal gb and no mention of ascites. So doubt at first glance that a liver redo ultrasound now would be indicated unless you were suspicious by exam of a new fluid wave to be present. The one I ordered if for tumor screening in 6m.  Symptoms as he related may be more gi related as distension, bloating and discomfort could be gi bowel/stomach issues and he may benefit being seen by Dr Chester who sees him for general gi issues.  I will pass message on to Dr Chester  so that he will be aware of this issue. I will ask staff in office to refer to Dr Chester for him to be seen soon re this.  Thank you for reaching out.   Jewel Dominguez   ===View-only below this line===   ----- Message ----- From: Zach Menjivar MD ([facility]) Sent: 10/5/2022   8:26 AM EDT To: Jewel Dominguez MD ([facility]) Subject: (No Subject)  Good morning, I saw Mr Wilkerson in clinic today he is having a change in his Bowel habits with bloating and discomfort just above his umbilicus, I have ordered labs including stool labs, I was wondering if you wanted to see him or move up the U/S orders?

## 2022-11-01 ENCOUNTER — LAB OUTSIDE AN ENCOUNTER (OUTPATIENT)
Dept: URBAN - METROPOLITAN AREA CLINIC 92 | Facility: CLINIC | Age: 62
End: 2022-11-01

## 2022-11-01 ENCOUNTER — OFFICE VISIT (OUTPATIENT)
Dept: URBAN - METROPOLITAN AREA CLINIC 92 | Facility: CLINIC | Age: 62
End: 2022-11-01
Payer: COMMERCIAL

## 2022-11-01 VITALS
HEIGHT: 69 IN | BODY MASS INDEX: 26.1 KG/M2 | TEMPERATURE: 96.8 F | DIASTOLIC BLOOD PRESSURE: 87 MMHG | SYSTOLIC BLOOD PRESSURE: 126 MMHG | HEART RATE: 88 BPM | WEIGHT: 176.2 LBS

## 2022-11-01 DIAGNOSIS — R10.13 EPIGASTRIC ABDOMINAL PAIN: ICD-10-CM

## 2022-11-01 DIAGNOSIS — R10.9 UNSPECIFIED ABDOMINAL PAIN: ICD-10-CM

## 2022-11-01 DIAGNOSIS — R14.0 ABDOMINAL DISTENSION (GASEOUS): ICD-10-CM

## 2022-11-01 DIAGNOSIS — R10.30 LOWER ABDOMINAL PAIN: ICD-10-CM

## 2022-11-01 DIAGNOSIS — K59.09 OTHER CONSTIPATION: ICD-10-CM

## 2022-11-01 DIAGNOSIS — R10.13 EPIGASTRIC BURNING SENSATION: ICD-10-CM

## 2022-11-01 PROCEDURE — 99214 OFFICE O/P EST MOD 30 MIN: CPT

## 2022-11-01 RX ORDER — LORATADINE 10 MG/1
1 TABLET TABLET ORAL ONCE A DAY
Status: ACTIVE | COMMUNITY

## 2022-11-01 RX ORDER — VITAMIN A 2400 MCG
AS DIRECTED CAPSULE ORAL ONCE A DAY
Status: ACTIVE | COMMUNITY

## 2022-11-01 RX ORDER — PREGABALIN 75 MG/1
1 CAPSULE CAPSULE ORAL TWICE A DAY
Status: ACTIVE | COMMUNITY

## 2022-11-01 RX ORDER — OMEPRAZOLE 40 MG/1
1 CAPSULE 30 MINUTES BEFORE MORNING MEAL CAPSULE, DELAYED RELEASE ORAL ONCE A DAY
Status: ACTIVE | COMMUNITY

## 2022-11-01 RX ORDER — PRAVASTATIN SODIUM 40 MG/1
1 TABLET TABLET ORAL ONCE A DAY
Status: ACTIVE | COMMUNITY

## 2022-11-01 RX ORDER — DRONABINOL 2.5 MG/1
1 CAPSULE IN THE EVENING OR AT BEDTIME CAPSULE ORAL
Status: ACTIVE | COMMUNITY

## 2022-11-01 RX ORDER — ACYCLOVIR 200 MG/1
1 CAPSULE CAPSULE ORAL TWICE A DAY
Status: ACTIVE | COMMUNITY

## 2022-11-01 RX ORDER — DEXTROSE 4 G
1 TABLET TABLET,CHEWABLE ORAL ONCE A DAY
Status: DISCONTINUED | COMMUNITY

## 2022-11-01 RX ORDER — IMIQUIMOD 50 MG/G
1 APPLICATION AT BEDTIME, LEAVE ON FOR 8 HOURS THEN WASH OFF CREAM TOPICAL
Status: ACTIVE | COMMUNITY

## 2022-11-01 RX ORDER — BICTEGRAVIR SODIUM, EMTRICITABINE, AND TENOFOVIR ALAFENAMIDE FUMARATE 50; 200; 25 MG/1; MG/1; MG/1
1 TABLET TABLET ORAL ONCE A DAY
Status: ACTIVE | COMMUNITY

## 2022-11-01 RX ORDER — LOPERAMIDE HYDROCHLORIDE 2 MG/1
1 TABLET AS NEEDED CAPSULE ORAL
Status: ACTIVE | COMMUNITY

## 2022-11-01 NOTE — PHYSICAL EXAM GASTROINTESTINAL
Abdomen,  soft,  left and right lower quadrant tenderness,  epigastrium tenderness, nondistended,  no guarding or rigidity,  no masses palpable,  normal bowel sounds Liver and Spleen,no hepatosplenomegaly

## 2022-11-01 NOTE — HPI-TODAY'S VISIT:
61-year-old male presents today with complaints of bloating.  He was sent to us by Dr. Dominguez.  He has been seeing Dr. Dominguez in the liver center due to cirrhosis, portal hypertension.  He is also seen Dr. Chester in past due to varices.  Last EGD 4-2022: 2 columns of grade 1 esophageal varices, 1 column of grade 2 esophageal varices, a single 7mm gastric polyp and two 6 to 10 mm polyps all polyps were found to be hyperplastic polyps , gastritis.    States that about 1 month ago he started having increasing abdominal pain along with bloating and cramping.  He states abdominal pain is in the epigastric area and can move around to his lower abdomen.  Tums helps at times with the pain.  He notes epigastric burning after eating within the first 30 minutes of any types of foods.  He does have a history of ulcers in his 20s and denies history of H. pylori in the past.  He has been taking omeprazole 40 mg every morning for the past 20 years.  He denies any acid reflux symptoms, nausea, vomiting, dysphagia, odynophagia, recent travel.  He did start meloxicam daily and this was started within the last 6 months due to joint pain and he does take other NSAIDs occasionally. He has an average of 3-5 bowel movements daily.  In the morning he states he will have loose bowel movements after taking his medications at night.  That in the afternoon he will start to have constipation.  He denies any new medications, hematochezia, melena, unexpected weight loss, family history of colon cancer or colon polyps. Last colon 1/2022: non bleeding rectal varices, single 6mm condyloma, IH, Was told to f/u with colo-rectal surgeon, did f/u and had 3 condylomas removed.

## 2022-11-09 ENCOUNTER — WEB ENCOUNTER (OUTPATIENT)
Dept: URBAN - METROPOLITAN AREA CLINIC 86 | Facility: CLINIC | Age: 62
End: 2022-11-09

## 2022-11-12 ENCOUNTER — WEB ENCOUNTER (OUTPATIENT)
Dept: URBAN - METROPOLITAN AREA CLINIC 92 | Facility: CLINIC | Age: 62
End: 2022-11-12

## 2022-11-14 ENCOUNTER — WEB ENCOUNTER (OUTPATIENT)
Dept: URBAN - METROPOLITAN AREA CLINIC 92 | Facility: CLINIC | Age: 62
End: 2022-11-14

## 2022-11-15 ENCOUNTER — TELEPHONE ENCOUNTER (OUTPATIENT)
Dept: URBAN - METROPOLITAN AREA CLINIC 7 | Facility: CLINIC | Age: 62
End: 2022-11-15

## 2022-11-15 NOTE — HPI-TODAY'S VISIT:
Rosaura I am off today but saw message and tried to call pt post reviewing it and the prior mri. The clot seen is new and a new mri is needed to better see its full extension. he needs to see his hematologist to consider to do a clotting issue work up but it is known that 5% of cirrhotics can get a clot per year and this may be the case. He has varices grade 1-2 at last look and with this needs egd asap and if varices sig to consider to band as ultimately the question is can he be anticoagulated or not. if he can, plan would be to follow the clot again with another mri 3m after that and see how the clot does. if it regresses that would be great to see. It is new clot as not seen on prior scans and recent u.s not suggesting this either. Wonder if any herbals being used recently as a potential trigger?  Called Rosaura as could not reach pt on two calls and she will try to call pt and notify Jeffery Hawk his ID [email or web address] to coordinate the care issues. He sees Dr Hughes for heme already so needs to follow with them re this.  MRI: report from [facility]  Narrative & Impression EXAM: CT ABDOMEN PELVIS W AND WO IV CONTRAST   CLINICAL INDICATION: R10.30: Lower abdominal pain, unspecified R10.13: Epigastric pain.   TECHNIQUE: Noncontrast images through the abdomen and pelvis were obtained. Following administration of non-ionic IV contrast, postcontrast images through the abdomen and pelvis were obtained. [id number] If applicable, point-of-care testing?was approved following departmental protocol.   COMPARISON: MRI abdomen 11/30/2021.   FINDINGS:  Lower Thorax: Left upper lobe 4.5 x 3.8 mm hyperdensity (series 4, image 2), consistent with calcified granuloma.   Liver: Heterogeneous enhancement with underlying changes of chronic liver disease including mildly nodular contour and fissural widening. No suspicious focal lesions.   Gallbladder/Biliary Tree: Normal.   Spleen: Splenomegaly, with spleen measuring 16.5 cm.   Pancreas: Normal.   Adrenal Glands: Normal.   Kidneys/Ureters:  Symmetrically enhancing. No hydronephrosis. Multiple bilateral subcentimeter hypoattenuating lesions too small to further characterize.   Gastrointestinal: The appendix is not definitively seen. No bowel obstruction. Asymmetric right pericolic edema compared to the left, which is likely related to chronic liver disease/portal colopathy, however colitis is not excluded.   Bladder: Normal.   Prostate/Seminal Vesicles: Normal.   Lymph Nodes: No pathological lymphadenopathy.   Vessels:  Filling defect within the portal vein and distal superior mesenteric vein, possibly involving branches of the SMV and extending into the left portal vein. Small esophageal and perigastric varices. Minimal calcifications of the abdominal aorta, bilateral renal and internal iliac arteries.   Peritoneum/Retroperitoneum: Normal.   Bones/Soft Tissues: Tiny fat-containing umbilical hernia.   IMPRESSION: 1.  Portal vein and superior mesenteric vein filling defect, which is highly suspicious for thrombus. 2.  Asymmetric right pericolic edema compared to the left, which is likely related to chronic liver disease/portal colopathy. Colitis less likely. 3.  Morphologic changes of chronic liver disease. No suspicious focal hepatic lesions.   4.  Sequelae of portal hypertension, including splenomegaly and small upper abdominal varices.    The images were reviewed and interpreted by Reid Boykin MD.

## 2022-11-16 ENCOUNTER — WEB ENCOUNTER (OUTPATIENT)
Dept: URBAN - METROPOLITAN AREA CLINIC 92 | Facility: CLINIC | Age: 62
End: 2022-11-16

## 2022-11-17 ENCOUNTER — OFFICE VISIT (OUTPATIENT)
Dept: URBAN - METROPOLITAN AREA MEDICAL CENTER 12 | Facility: MEDICAL CENTER | Age: 62
End: 2022-11-17
Payer: COMMERCIAL

## 2022-11-17 ENCOUNTER — TELEPHONE ENCOUNTER (OUTPATIENT)
Dept: URBAN - METROPOLITAN AREA CLINIC 17 | Facility: CLINIC | Age: 62
End: 2022-11-17

## 2022-11-17 DIAGNOSIS — I85.10 ESOPH VARICE OTHER DIS: ICD-10-CM

## 2022-11-17 DIAGNOSIS — K74.69 CIRRHOSIS, CRYPTOGENIC: ICD-10-CM

## 2022-11-17 PROCEDURE — 43244 EGD VARICES LIGATION: CPT | Performed by: INTERNAL MEDICINE

## 2022-11-17 RX ORDER — VITAMIN A 2400 MCG
AS DIRECTED CAPSULE ORAL ONCE A DAY
Status: ACTIVE | COMMUNITY

## 2022-11-17 RX ORDER — PREGABALIN 75 MG/1
1 CAPSULE CAPSULE ORAL TWICE A DAY
Status: ACTIVE | COMMUNITY

## 2022-11-17 RX ORDER — IMIQUIMOD 50 MG/G
1 APPLICATION AT BEDTIME, LEAVE ON FOR 8 HOURS THEN WASH OFF CREAM TOPICAL
Status: ACTIVE | COMMUNITY

## 2022-11-17 RX ORDER — LOPERAMIDE HYDROCHLORIDE 2 MG/1
1 TABLET AS NEEDED CAPSULE ORAL
Status: ACTIVE | COMMUNITY

## 2022-11-17 RX ORDER — PRAVASTATIN SODIUM 40 MG/1
1 TABLET TABLET ORAL ONCE A DAY
Status: ACTIVE | COMMUNITY

## 2022-11-17 RX ORDER — DRONABINOL 2.5 MG/1
1 CAPSULE IN THE EVENING OR AT BEDTIME CAPSULE ORAL
Status: ACTIVE | COMMUNITY

## 2022-11-17 RX ORDER — LORATADINE 10 MG/1
1 TABLET TABLET ORAL ONCE A DAY
Status: ACTIVE | COMMUNITY

## 2022-11-17 RX ORDER — OMEPRAZOLE 40 MG/1
1 CAPSULE 30 MINUTES BEFORE MORNING MEAL CAPSULE, DELAYED RELEASE ORAL ONCE A DAY
Status: ACTIVE | COMMUNITY

## 2022-11-17 RX ORDER — BICTEGRAVIR SODIUM, EMTRICITABINE, AND TENOFOVIR ALAFENAMIDE FUMARATE 50; 200; 25 MG/1; MG/1; MG/1
1 TABLET TABLET ORAL ONCE A DAY
Status: ACTIVE | COMMUNITY

## 2022-11-17 RX ORDER — ACYCLOVIR 200 MG/1
1 CAPSULE CAPSULE ORAL TWICE A DAY
Status: ACTIVE | COMMUNITY

## 2022-11-18 ENCOUNTER — WEB ENCOUNTER (OUTPATIENT)
Dept: URBAN - METROPOLITAN AREA CLINIC 92 | Facility: CLINIC | Age: 62
End: 2022-11-18

## 2022-11-29 ENCOUNTER — WEB ENCOUNTER (OUTPATIENT)
Dept: URBAN - METROPOLITAN AREA CLINIC 92 | Facility: CLINIC | Age: 62
End: 2022-11-29

## 2022-12-02 ENCOUNTER — WEB ENCOUNTER (OUTPATIENT)
Dept: URBAN - METROPOLITAN AREA CLINIC 92 | Facility: CLINIC | Age: 62
End: 2022-12-02

## 2022-12-06 ENCOUNTER — OFFICE VISIT (OUTPATIENT)
Dept: URBAN - METROPOLITAN AREA TELEHEALTH 2 | Facility: TELEHEALTH | Age: 62
End: 2022-12-06
Payer: COMMERCIAL

## 2022-12-06 ENCOUNTER — WEB ENCOUNTER (OUTPATIENT)
Dept: URBAN - METROPOLITAN AREA CLINIC 86 | Facility: CLINIC | Age: 62
End: 2022-12-06

## 2022-12-06 ENCOUNTER — WEB ENCOUNTER (OUTPATIENT)
Dept: URBAN - METROPOLITAN AREA CLINIC 92 | Facility: CLINIC | Age: 62
End: 2022-12-06

## 2022-12-06 VITALS — HEIGHT: 69 IN | WEIGHT: 169 LBS | BODY MASS INDEX: 25.03 KG/M2

## 2022-12-06 DIAGNOSIS — R93.5 ABNORMAL MRI OF ABDOMEN: ICD-10-CM

## 2022-12-06 DIAGNOSIS — K76.6 PORTAL HYPERTENSION: ICD-10-CM

## 2022-12-06 DIAGNOSIS — I81 PORTAL VEIN THROMBOSIS: ICD-10-CM

## 2022-12-06 DIAGNOSIS — K74.60 CIRRHOSIS OF LIVER WITHOUT ASCITES, UNSPECIFIED HEPATIC CIRRHOSIS TYPE: ICD-10-CM

## 2022-12-06 PROCEDURE — 99215 OFFICE O/P EST HI 40 MIN: CPT

## 2022-12-06 RX ORDER — OMEPRAZOLE 40 MG/1
1 CAPSULE 30 MINUTES BEFORE MORNING MEAL CAPSULE, DELAYED RELEASE ORAL ONCE A DAY
Status: ACTIVE | COMMUNITY

## 2022-12-06 RX ORDER — NADOLOL 40 MG/1
1 TABLET TABLET ORAL ONCE A DAY
Qty: 30 | Refills: 6 | OUTPATIENT
Start: 2022-12-08

## 2022-12-06 RX ORDER — ACYCLOVIR 200 MG/1
1 CAPSULE CAPSULE ORAL TWICE A DAY
Status: ACTIVE | COMMUNITY

## 2022-12-06 RX ORDER — PREGABALIN 75 MG/1
1 CAPSULE CAPSULE ORAL TWICE A DAY
Status: ACTIVE | COMMUNITY

## 2022-12-06 RX ORDER — BICTEGRAVIR SODIUM, EMTRICITABINE, AND TENOFOVIR ALAFENAMIDE FUMARATE 50; 200; 25 MG/1; MG/1; MG/1
1 TABLET TABLET ORAL ONCE A DAY
Status: ACTIVE | COMMUNITY

## 2022-12-06 RX ORDER — LOPERAMIDE HYDROCHLORIDE 2 MG/1
1 TABLET AS NEEDED CAPSULE ORAL
Status: ACTIVE | COMMUNITY

## 2022-12-06 RX ORDER — DRONABINOL 2.5 MG/1
1 CAPSULE IN THE EVENING OR AT BEDTIME CAPSULE ORAL
Status: ACTIVE | COMMUNITY

## 2022-12-06 RX ORDER — VITAMIN A 2400 MCG
AS DIRECTED CAPSULE ORAL ONCE A DAY
Status: ACTIVE | COMMUNITY

## 2022-12-06 RX ORDER — IMIQUIMOD 50 MG/G
1 APPLICATION AT BEDTIME, LEAVE ON FOR 8 HOURS THEN WASH OFF CREAM TOPICAL
Status: ACTIVE | COMMUNITY

## 2022-12-06 RX ORDER — LORATADINE 10 MG/1
1 TABLET TABLET ORAL ONCE A DAY
Status: ACTIVE | COMMUNITY

## 2022-12-06 RX ORDER — PRAVASTATIN SODIUM 40 MG/1
1 TABLET TABLET ORAL ONCE A DAY
Status: ACTIVE | COMMUNITY

## 2022-12-06 NOTE — HPI-TODAY'S VISIT:
This is a 62 year old male referred  by Viry Serrano PA-C and last seen July 2022 for an evaluation of abnormal MRI with suspected cirrhosis/portal htn and new clot seen on scan.  A copy of this note will be sent to the referring provider.  Pt did the mri recently as ct had issues and confirmed the clot.   EXAM: MR ABDOMEN W AND WO CONTRAST  CLINICAL INDICATION: Cirrhosis Of Liver.  TECHNIQUE: Multisequence, multiplanar MRI of the abdomen was performed without and with intravenous contrast. [id number]  COMPARISON: 11/30/2021 MRI, 11/12/2022 CT   FINDINGS:  Evaluation is slightly limited by respiratory motion artifact and subsequent misregistration on subtraction imaging.   Lower Thorax: Normal.   Liver: No fat or iron. Morphologic changes of chronic liver disease. Evaluation for arterially enhancing lesions is limited due to early arterial phase of contrast with nonopacification of the portal vein, in part due to known portal venous thrombus. Within these confines: No arterially enhancing lesions. No lesions with washout or capsule.    Gallbladder/Biliary Tree: Normal.   Spleen: Enlarged measuring 16 cm.   Pancreas: Mildly atrophic without ductal dilation. No focal suspicious lesions.   Adrenal Glands: Normal.    Kidneys/Ureters: Renal cysts.   Gastrointestinal: No obstruction.    Lymph Nodes: Normal.   Vessels: Partial opacification of the superior mesenteric vein extending to the main portal vein, seen on prior CT. Near complete opacification of the left portal vein. The right portal vein branches are partially occluded centrally and grossly patent distally. Overall, venous occlusion appears to be secondary to bland thrombus in the main portal and superior mesenteric vein, despite presence of diffuse restricted diffusion. Evaluation for underlying enhancement is limited within the intrahepatic branches due to respiratory motion artifact and misregistration.   Splenic vein is patent. Small upper abdominal, perigastric and paraesophageal varices. Abdominal aorta is normal in course and caliber.   Peritoneum/Retroperitoneum: No significant ascites.   Bones/Soft Tissues: No aggressive osseous lesions.   IMPRESSION: 1. Morphologic changes of chronic liver disease. No definite suspicious liver lesions within the limitations described above. 2. 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 Findings are favored secondary to bland thrombus; however, in this high-risk patient, underlying isolated tumor thrombus is difficult to exclude given presence of restricted diffusion and  extensive respiratory motion artifact and misregistration which limits the use of subtraction imaging. Attention on short-term interval follow-up MRI is recommended. 3. Sequela of portal hypertension, as above.   Critical findings were communicated electronically with and acknowledged by Dr. Zach Menjivar on 11/14/2022 10:49 AM. The following impression point(s) have been communicated: Portal vein and SMV filling defect highly suspicious for thrombus.   The images were reviewed and interpreted by Reid Boykin MD. Addended by Reid Boykin MD on 11/14/2022 12:18 PM  Dr Hughes wanted to put him on blood thinner and we needed to see what Dr Terry said: She mentioned that she would proceed to AC and she wanted on beta blocker as possible. He had bveen banded x 3 by her recently.  He says he did not speak with her: he reaches her through the call office : [phone]    EXAM: CT ABDOMEN PELVIS W AND WO IV CONTRAST   CLINICAL INDICATION: R10.30: Lower abdominal pain, unspecified R10.13: Epigastric pain.   TECHNIQUE: Noncontrast images through the abdomen and pelvis were obtained. Following administration of non-ionic IV contrast, postcontrast images through the abdomen and pelvis were obtained. ESRC.[id number] If applicable, point-of-care testing?was approved following departmental protocol.   COMPARISON: MRI abdomen 11/30/2021.   FINDINGS:  Lower Thorax: Left upper lobe 4.5 x 3.8 mm hyperdensity (series 4, image 2), consistent with calcified granuloma.   Liver: Heterogeneous enhancement with underlying changes of chronic liver disease including mildly nodular contour and fissural widening. No suspicious focal lesions.   Gallbladder/Biliary Tree: Normal.   Spleen: Splenomegaly, with spleen measuring 16.5 cm.   Pancreas: Normal.   Adrenal Glands: Normal.   Kidneys/Ureters:  Symmetrically enhancing. No hydronephrosis. Multiple bilateral subcentimeter hypoattenuating lesions too small to further characterize.   Gastrointestinal: The appendix is not definitively seen. No bowel obstruction. Asymmetric right pericolic edema compared to the left, which is likely related to chronic liver disease/portal colopathy, however colitis is not excluded.   Bladder: Normal.   Prostate/Seminal Vesicles: Normal.   Lymph Nodes: No pathological lymphadenopathy.   Vessels:  Filling defect within the portal vein and distal superior mesenteric vein, possibly involving branches of the SMV and extending into the left portal vein. Small esophageal and perigastric varices. Minimal calcifications of the abdominal aorta, bilateral renal and internal iliac arteries.   Peritoneum/Retroperitoneum: Normal.   Bones/Soft Tissues: Tiny fat-containing umbilical hernia.   IMPRESSION: 1.  Portal vein and superior mesenteric vein filling defect, which is highly suspicious for thrombus. 2.  Asymmetric right pericolic edema compared to the left, which is likely related to chronic liver disease/portal colopathy. Colitis less likely. 3.  Morphologic changes of chronic liver disease. No suspicious focal hepatic lesions.   4.  Sequelae of portal hypertension, including splenomegaly and small upper abdominal varices.    The images were reviewed and interpreted by Reid Boykin MD.  Nov 1 db 0.21, IB 0.69. Wbc 5.1 hg 16.4 platelets 80 and mcv 92.7. Na 140 and k 4.4 an cl 103 and co2 29 and ca 9.4 and bun 17 and cr 0.7 and glu 151 and tp 7.7 and alb 4.1 and ast 36 and alt 35 and tb 0.9 and HIV 93. RPR neg. CD4 147 down from 192.  He was found to have the clot as few months ago said may have overstressed shoulder and had fall earlier and this was found some nonspecific pain in aug.  [place] [facility] [place] labs sent in: na 140 and k 4.8 and cl 101 and co2 29 and glu 203 elevated and please discuss with local provider.  TB 0.7 and alb 4.2 and ca 9.2. cr 0.72. Ast 37 and alt 38 (ideal alt less than 35). Alk 86.   Wbc 3.6 little low and hct 46.3 and plat 72 low. Prior other labs: cmp showed glucose 117 also up. Ast 44 and alt 41 so little lower now. Cbc not run and inr 1.1. Meld using the labs was 7 and meld na 7 so remains low.  Pt says he has high sugar and being watched.  June 2022 labs show that the CBC was clumped and so not able to be run.  June 6 ultrasound shows liver to be mildly coarsened with regards to the echotexture but with no lesions. Bile duct showed no dilated bile ducts and common duct was 3 mm. Gallbladder was normal. Pancreas was obscured by overlying structures. Right kidney 12.3 cm and left kidney 13.6 cm.  No hydronephrosis. Spleen enlarged at 16.7 cm. Liver vessels patent. Prior scan was an MRI from November 30 of last year that showed that the liver had mild morphologic changes of chronic liver disease with fissural widening and minimal nodular contour and no suspicious lesions.  Spleen was enlarged and also at 16 cm.  Small esophageal and perigastric varices were seen.   March 28, 2022 labs show IgM elevated slightly up to 247 with normal from 20 up to 172.  INR 1.1.  White blood cell count 4.4 hemoglobin 15.7 plate count 79 MCV 94 neutrophils 2.8 lymphocytes 0.9 glucose 91 BUN is 16 creatinine 0.73 sodium 138 potassium 4.5 albumin 4.3 bilirubin 0.7 alkaline phosphatase 83 AST 46 and ALT 48. Meld 7  and meld na 7.  He did the covid 19 booster last year he thinks 10-7-21 and has not done the 2nd booster.  January 17, 2022 labs show white blood cell count 4.9 hemoglobin 16.5 hematocrit 45.5 platelet count low at 72.  MCV normal at 92.  Neutrophils 2.9 lymphocytes 1.2.  Pt normally bp 127/90 range. He says not on meds for bp at this time. Maybe see primary to start a low dose beta blocker like carvedilol. That was recommended by Dr Terry as blood thinner and the varices.  Nov 17 2022 egd 3 columns grade 2 ev and no bleeding and incompletely eradicated with banding. SHe recomended egd in 2m.  EGD done by  on April 11, 2022 at [facility] shows 2 columns of grade 1 varices and one Grade 2 varices in the lower third of the esophagus.  Single 7 mm sessile polyp with no bleeding and no stigmata of recent bleeding seen at the cardia. It was removed with hot snare.  It was retrieved.  Two  6 to 10 mm sessile polyps with no bleeding seen on the lesser curvature of the stomach and removed with hot snare.  Mild inflammation characterized by the erythema and granularity seen of the gastric antrum duodenum normal.  Pathology of this came back as gastric polyps x3 with cauterized gastric mucosa with regenerative epithelial changes and cystic dilation with suggestive hyperplastic polyps.    January 25, 2022 EGD showed 2 columns grade 1 varices in 1: Grade 2 varices and diffuse moderate inflammation characterized by congestion erythema granularity of the gastric antrum.  Multiple 2 to 10 mm polyp seen with no bleeding.  January 25, 2022 colonoscopy showed entire colon was normal with nonbleeding rectal varices.  A single subcentimeter condyloma was seen.  Internal hemorrhoids not bleeding was seen at the endoscopy.  There were small in nature.  Patient was to be referred to colorectal surgeon.  He did see april 19 the rectal surgeon and they did a treatment for a condyloma.  November 30, 2021 MRI showed at [place] lower thorax normal.  Liver with no fat or iron but with mild morphologic changes chronic liver disease with fissural widening and minimal nodular contour and no suspicious lesions.  Splenomegaly to 16 cm seen.  Gallbladder/biliary tree normal.  Pancreas normal.  Kidneys normal.  Liver vessels patent.  Small esophageal and perigastric varices seen.    PT Jan 2022 seen by Ms Ruelas LAURENT for TH.  he is on mvi gummies which has 2000% biotin in it. would recommend he avoid. Also on mobic for a year or so, recommend he avoid this as well.   labs 1/3/22: na 138 alt 41 ast 42 alp 77tb 0.7 wbc 4.5 hgb 16.2 plt low 75      RECAP: He has a hx of HIV diagnosed in 1986- treatment initiated in 1996 and follows with Jeffery Hawk PA-C. and Dr Matthew.  Currently on Biktarvy since 01/2020.  Prezcobix was recently discontinued about 2 months ago as concern this was contributing to thrombocytopenia.   He has been seeing hematology (Dr. Yvette Hughes) for Thrombocytopenia: ranges from 139K in 1/28/2013 to 114K in 1/2018 when stabilized and dropped to the 90's in February 2020. Concern of drug related thrombocytopenia with Biktarvy and Prezcobix. CD4 is below 200 and so on Atovaquone (Mepron) for PCP prophylaxis.    MRI 12/2/2021 Liver: No fat or iron. Mild morphologic changes of chronic liver disease with fissural widening and minimal nodular contour. No suspicious lesions.   Patent portal vein, with sequela of portal hypertension, including splenomegaly and small upper abdominal varices.  No personal hx of fatty liver, hepatitis, or other liver disease.  No family hx of liver disease.  Records indicate hep c and hep b negative 2014.  Hep B core+ as of 11/2021 but otherwise hepatitis work up negative.  LFTs in [place] reviewed from 11/2021 and WNL (alt 39, alp 76, ast 37) as are his Fe+ studies. Plt 85 11/15/21.   INR 1.08 10/2021.    Hx of hld currently on pravastatin 2017.   No hx of DM and last a1c 5.4%.  Weight has been fairly stable at about 170-175 over past 6 years, was up to max of 210 in late 1990s.  Summer 2022 175 and Winter 2022 to 170.  No alcohol use.    Marijuana use daily.  Some reports of this causing inc lfts and inc fibrosis. Still doing that.  Denies other drug or tobacco use.  Denies use of otc herbals/supplements.  He has a hx of osteoporosis with last DEXA November 2021 improved, s/p 3 Reclast infusions, follows with Dr. Namita Long endocrinology. He says not seen then in a while and he will check in.  Seem by Viry Serrano PA-C, 12/8/21 for fecal occult +. 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 Denies melena, hematochezia, N/V. Notes abdominal gurgling for years with ARVs without triggers. No anorexia or weight loss. Denies GERD and dysphagia.  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 No prior EGD.    Plan: 1. We will reach out to Dr Hughes re the pt and the need to anticoagulate and Dr Terry felt he should be AC, 2. He will see primary re the beta blocker like carvedilol low dose. 3. If issues then with hr less than 60 or sbp les 100 to hold it. 4. He will need to see Dr Terry re next plans re the varices and the eventual. 5. Plan for the mri feb 14 to follow up on this. 6. Pt will see us in Late feb to check on this.   Stressed to pt the need for social distancing and strict handwashing and wearing a mask and to follow any other new or added CDC recommendations as this is an evolving target.  Duration of the visit was 43 minutes with 10 minutes of chart prep and 33 minutes by healow video for this TeleMed visit with time reviewing their prior and recent records and labs and discussing their current status and future plans for care for the patient.

## 2022-12-08 ENCOUNTER — WEB ENCOUNTER (OUTPATIENT)
Dept: URBAN - METROPOLITAN AREA CLINIC 86 | Facility: CLINIC | Age: 62
End: 2022-12-08

## 2022-12-09 ENCOUNTER — WEB ENCOUNTER (OUTPATIENT)
Dept: URBAN - METROPOLITAN AREA CLINIC 92 | Facility: CLINIC | Age: 62
End: 2022-12-09

## 2022-12-13 ENCOUNTER — WEB ENCOUNTER (OUTPATIENT)
Dept: URBAN - METROPOLITAN AREA CLINIC 86 | Facility: CLINIC | Age: 62
End: 2022-12-13

## 2022-12-15 ENCOUNTER — OFFICE VISIT (OUTPATIENT)
Dept: URBAN - METROPOLITAN AREA SURGERY CENTER 16 | Facility: SURGERY CENTER | Age: 62
End: 2022-12-15

## 2023-01-03 ENCOUNTER — WEB ENCOUNTER (OUTPATIENT)
Dept: URBAN - METROPOLITAN AREA CLINIC 92 | Facility: CLINIC | Age: 63
End: 2023-01-03

## 2023-01-07 ENCOUNTER — WEB ENCOUNTER (OUTPATIENT)
Dept: URBAN - METROPOLITAN AREA CLINIC 86 | Facility: CLINIC | Age: 63
End: 2023-01-07

## 2023-01-07 ENCOUNTER — WEB ENCOUNTER (OUTPATIENT)
Dept: URBAN - METROPOLITAN AREA CLINIC 92 | Facility: CLINIC | Age: 63
End: 2023-01-07

## 2023-01-09 ENCOUNTER — WEB ENCOUNTER (OUTPATIENT)
Dept: URBAN - METROPOLITAN AREA CLINIC 92 | Facility: CLINIC | Age: 63
End: 2023-01-09

## 2023-01-16 ENCOUNTER — LAB OUTSIDE AN ENCOUNTER (OUTPATIENT)
Dept: URBAN - METROPOLITAN AREA TELEHEALTH 2 | Facility: TELEHEALTH | Age: 63
End: 2023-01-16

## 2023-01-17 ENCOUNTER — WEB ENCOUNTER (OUTPATIENT)
Dept: URBAN - METROPOLITAN AREA CLINIC 92 | Facility: CLINIC | Age: 63
End: 2023-01-17

## 2023-01-17 ENCOUNTER — OFFICE VISIT (OUTPATIENT)
Dept: URBAN - METROPOLITAN AREA CLINIC 92 | Facility: CLINIC | Age: 63
End: 2023-01-17

## 2023-01-20 ENCOUNTER — LAB OUTSIDE AN ENCOUNTER (OUTPATIENT)
Dept: URBAN - METROPOLITAN AREA TELEHEALTH 2 | Facility: TELEHEALTH | Age: 63
End: 2023-01-20

## 2023-01-23 ENCOUNTER — OFFICE VISIT (OUTPATIENT)
Dept: URBAN - METROPOLITAN AREA TELEHEALTH 2 | Facility: TELEHEALTH | Age: 63
End: 2023-01-23

## 2023-01-23 NOTE — HPI-TODAY'S VISIT:
This is a 62 year old male referred  by Viry Serrano PA-C and last seen Dec 2022 for an evaluation of abnormal MRI with suspected cirrhosis/portal htn and new clot seen on scan.  A copy of this note will be sent to the referring provider.  Pt did the mri recently as ct had issues and confirmed the clot.   EXAM: MR ABDOMEN W AND WO CONTRAST  CLINICAL INDICATION: Cirrhosis Of Liver.  TECHNIQUE: Multisequence, multiplanar MRI of the abdomen was performed without and with intravenous contrast. [id number]  COMPARISON: 11/30/2021 MRI, 11/12/2022 CT   FINDINGS:  Evaluation is slightly limited by respiratory motion artifact and subsequent misregistration on subtraction imaging.   Lower Thorax: Normal.   Liver: No fat or iron. Morphologic changes of chronic liver disease. Evaluation for arterially enhancing lesions is limited due to early arterial phase of contrast with nonopacification of the portal vein, in part due to known portal venous thrombus. Within these confines: No arterially enhancing lesions. No lesions with washout or capsule.    Gallbladder/Biliary Tree: Normal.   Spleen: Enlarged measuring 16 cm.   Pancreas: Mildly atrophic without ductal dilation. No focal suspicious lesions.   Adrenal Glands: Normal.    Kidneys/Ureters: Renal cysts.   Gastrointestinal: No obstruction.    Lymph Nodes: Normal.   Vessels: Partial opacification of the superior mesenteric vein extending to the main portal vein, seen on prior CT. Near complete opacification of the left portal vein. The right portal vein branches are partially occluded centrally and grossly patent distally. Overall, venous occlusion appears to be secondary to bland thrombus in the main portal and superior mesenteric vein, despite presence of diffuse restricted diffusion. Evaluation for underlying enhancement is limited within the intrahepatic branches due to respiratory motion artifact and misregistration.   Splenic vein is patent. Small upper abdominal, perigastric and paraesophageal varices. Abdominal aorta is normal in course and caliber.   Peritoneum/Retroperitoneum: No significant ascites.   Bones/Soft Tissues: No aggressive osseous lesions.   IMPRESSION: 1. Morphologic changes of chronic liver disease. No definite suspicious liver lesions within the limitations described above. 2. 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 Findings are favored secondary to bland thrombus; however, in this high-risk patient, underlying isolated tumor thrombus is difficult to exclude given presence of restricted diffusion and  extensive respiratory motion artifact and misregistration which limits the use of subtraction imaging. Attention on short-term interval follow-up MRI is recommended. 3. Sequela of portal hypertension, as above.   Critical findings were communicated electronically with and acknowledged by Dr. Zach Menjivar on 11/14/2022 10:49 AM. The following impression point(s) have been communicated: Portal vein and SMV filling defect highly suspicious for thrombus.   The images were reviewed and interpreted by Reid Boykin MD. Addended by Reid Boykin MD on 11/14/2022 12:18 PM  Dr Hughes wanted to put him on blood thinner and we needed to see what Dr Terry said: She mentioned that she would proceed to AC and she wanted on beta blocker as possible. He had bveen banded x 3 by her recently.  He says he did not speak with her: he reaches her through the call office : [phone]    EXAM: CT ABDOMEN PELVIS W AND WO IV CONTRAST   CLINICAL INDICATION: R10.30: Lower abdominal pain, unspecified R10.13: Epigastric pain.   TECHNIQUE: Noncontrast images through the abdomen and pelvis were obtained. Following administration of non-ionic IV contrast, postcontrast images through the abdomen and pelvis were obtained. ESRC.[id number] If applicable, point-of-care testing?was approved following departmental protocol.   COMPARISON: MRI abdomen 11/30/2021.   FINDINGS:  Lower Thorax: Left upper lobe 4.5 x 3.8 mm hyperdensity (series 4, image 2), consistent with calcified granuloma.   Liver: Heterogeneous enhancement with underlying changes of chronic liver disease including mildly nodular contour and fissural widening. No suspicious focal lesions.   Gallbladder/Biliary Tree: Normal.   Spleen: Splenomegaly, with spleen measuring 16.5 cm.   Pancreas: Normal.   Adrenal Glands: Normal.   Kidneys/Ureters:  Symmetrically enhancing. No hydronephrosis. Multiple bilateral subcentimeter hypoattenuating lesions too small to further characterize.   Gastrointestinal: The appendix is not definitively seen. No bowel obstruction. Asymmetric right pericolic edema compared to the left, which is likely related to chronic liver disease/portal colopathy, however colitis is not excluded.   Bladder: Normal.   Prostate/Seminal Vesicles: Normal.   Lymph Nodes: No pathological lymphadenopathy.   Vessels:  Filling defect within the portal vein and distal superior mesenteric vein, possibly involving branches of the SMV and extending into the left portal vein. Small esophageal and perigastric varices. Minimal calcifications of the abdominal aorta, bilateral renal and internal iliac arteries.   Peritoneum/Retroperitoneum: Normal.   Bones/Soft Tissues: Tiny fat-containing umbilical hernia.   IMPRESSION: 1.  Portal vein and superior mesenteric vein filling defect, which is highly suspicious for thrombus. 2.  Asymmetric right pericolic edema compared to the left, which is likely related to chronic liver disease/portal colopathy. Colitis less likely. 3.  Morphologic changes of chronic liver disease. No suspicious focal hepatic lesions.   4.  Sequelae of portal hypertension, including splenomegaly and small upper abdominal varices.    The images were reviewed and interpreted by Reid Boykin MD.  Nov 1 db 0.21, IB 0.69. Wbc 5.1 hg 16.4 platelets 80 and mcv 92.7. Na 140 and k 4.4 an cl 103 and co2 29 and ca 9.4 and bun 17 and cr 0.7 and glu 151 and tp 7.7 and alb 4.1 and ast 36 and alt 35 and tb 0.9 and HIV 93. RPR neg. CD4 147 down from 192.  He was found to have the clot as few months ago said may have overstressed shoulder and had fall earlier and this was found some nonspecific pain in aug.  [facility] labs sent in: na 140 and k 4.8 and cl 101 and co2 29 and glu 203 elevated and please discuss with local provider.  TB 0.7 and alb 4.2 and ca 9.2. cr 0.72. Ast 37 and alt 38 (ideal alt less than 35). Alk 86.   Wbc 3.6 little low and hct 46.3 and plat 72 low. Prior other labs: cmp showed glucose 117 also up. Ast 44 and alt 41 so little lower now. Cbc not run and inr 1.1. Meld using the labs was 7 and meld na 7 so remains low.  Pt says he has high sugar and being watched.  June 2022 labs show that the CBC was clumped and so not able to be run.  June 6 ultrasound shows liver to be mildly coarsened with regards to the echotexture but with no lesions. Bile duct showed no dilated bile ducts and common duct was 3 mm. Gallbladder was normal. Pancreas was obscured by overlying structures. Right kidney 12.3 cm and left kidney 13.6 cm.  No hydronephrosis. Spleen enlarged at 16.7 cm. Liver vessels patent. Prior scan was an MRI from November 30 of last year that showed that the liver had mild morphologic changes of chronic liver disease with fissural widening and minimal nodular contour and no suspicious lesions.  Spleen was enlarged and also at 16 cm.  Small esophageal and perigastric varices were seen.   March 28, 2022 labs show IgM elevated slightly up to 247 with normal from 20 up to 172.  INR 1.1.  White blood cell count 4.4 hemoglobin 15.7 plate count 79 MCV 94 neutrophils 2.8 lymphocytes 0.9 glucose 91 BUN is 16 creatinine 0.73 sodium 138 potassium 4.5 albumin 4.3 bilirubin 0.7 alkaline phosphatase 83 AST 46 and ALT 48. Meld 7  and meld na 7.  He did the covid 19 booster last year he thinks 10-7-21 and has not done the 2nd booster.  January 17, 2022 labs show white blood cell count 4.9 hemoglobin 16.5 hematocrit 45.5 platelet count low at 72.  MCV normal at 92.  Neutrophils 2.9 lymphocytes 1.2.  Pt normally bp 127/90 range. He says not on meds for bp at this time. Maybe see primary to start a low dose beta blocker like carvedilol. That was recommended by Dr Terry as blood thinner and the varices.  Nov 17 2022 egd 3 columns grade 2 ev and no bleeding and incompletely eradicated with banding. SHe recomended egd in 2m.  EGD done by  on April 11, 2022 at [facility] shows 2 columns of grade 1 varices and one Grade 2 varices in the lower third of the esophagus.  Single 7 mm sessile polyp with no bleeding and no stigmata of recent bleeding seen at the cardia. It was removed with hot snare.  It was retrieved.  Two  6 to 10 mm sessile polyps with no bleeding seen on the lesser curvature of the stomach and removed with hot snare.  Mild inflammation characterized by the erythema and granularity seen of the gastric antrum duodenum normal.  Pathology of this came back as gastric polyps x3 with cauterized gastric mucosa with regenerative epithelial changes and cystic dilation with suggestive hyperplastic polyps.    January 25, 2022 EGD showed 2 columns grade 1 varices in 1: Grade 2 varices and diffuse moderate inflammation characterized by congestion erythema granularity of the gastric antrum.  Multiple 2 to 10 mm polyp seen with no bleeding.  January 25, 2022 colonoscopy showed entire colon was normal with nonbleeding rectal varices.  A single subcentimeter condyloma was seen.  Internal hemorrhoids not bleeding was seen at the endoscopy.  There were small in nature.  Patient was to be referred to colorectal surgeon.  He did see april 19 the rectal surgeon and they did a treatment for a condyloma.  November 30, 2021 MRI showed at [facility] lower thorax normal.  Liver with no fat or iron but with mild morphologic changes chronic liver disease with fissural widening and minimal nodular contour and no suspicious lesions.  Splenomegaly to 16 cm seen.  Gallbladder/biliary tree normal.  Pancreas normal.  Kidneys normal.  Liver vessels patent.  Small esophageal and perigastric varices seen.    PT Jan 2022 seen by Ms Ruelas LAURENT for TH.  he is on mvi gummies which has 2000% biotin in it. would recommend he avoid. Also on mobic for a year or so, recommend he avoid this as well.   labs 1/3/22: na 138 alt 41 ast 42 alp 77tb 0.7 wbc 4.5 hgb 16.2 plt low 75      RECAP: He has a hx of HIV diagnosed in 1986- treatment initiated in 1996 and follows with Jeffery Hawk PA-C. and Dr Matthew.  Currently on Biktarvy since 01/2020.  Prezcobix was recently discontinued about 2 months ago as concern this was contributing to thrombocytopenia.   He has been seeing hematology (Dr. Yvette Hughes) for Thrombocytopenia: ranges from 139K in 1/28/2013 to 114K in 1/2018 when stabilized and dropped to the 90's in February 2020. Concern of drug related thrombocytopenia with Biktarvy and Prezcobix. CD4 is below 200 and so on Atovaquone (Mepron) for PCP prophylaxis.    MRI 12/2/2021 Liver: No fat or iron. Mild morphologic changes of chronic liver disease with fissural widening and minimal nodular contour. No suspicious lesions.   Patent portal vein, with sequela of portal hypertension, including splenomegaly and small upper abdominal varices.  No personal hx of fatty liver, hepatitis, or other liver disease.  No family hx of liver disease.  Records indicate hep c and hep b negative 2014.  Hep B core+ as of 11/2021 but otherwise hepatitis work up negative.  LFTs in [facility] reviewed from 11/2021 and WNL (alt 39, alp 76, ast 37) as are his Fe+ studies. Plt 85 11/15/21.   INR 1.08 10/2021.    Hx of hld currently on pravastatin 2017.   No hx of DM and last a1c 5.4%.  Weight has been fairly stable at about 170-175 over past 6 years, was up to max of 210 in late 1990s.  Summer 2022 175 and Winter 2022 to 170.  No alcohol use.    Marijuana use daily.  Some reports of this causing inc lfts and inc fibrosis. Still doing that.  Denies other drug or tobacco use.  Denies use of otc herbals/supplements.  He has a hx of osteoporosis with last DEXA November 2021 improved, s/p 3 Reclast infusions, follows with Dr. Namita Long endocrinology. He says not seen then in a while and he will check in.  Seem by Viry Serrano PA-C, 12/8/21 for fecal occult +. 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 Denies melena, hematochezia, N/V. Notes abdominal gurgling for years with ARVs without triggers. No anorexia or weight loss. Denies GERD and dysphagia.  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 No prior EGD.    Plan: 1. We will reach out to Dr Hughes re the pt and the need to anticoagulate and Dr Terry felt he should be AC, 2. He will see primary re the beta blocker like carvedilol low dose. 3. If issues then with hr less than 60 or sbp les 100 to hold it. 4. He will need to see Dr Terry re next plans re the varices and the eventual. 5. Plan for the mri feb 14 to follow up on this. 6. Pt will see us in Late feb to check on this.   Stressed to pt the need for social distancing and strict handwashing and wearing a mask and to follow any other new or added CDC recommendations as this is an evolving target.  Duration of the visit was minutes with 10 minutes of chart prep and 20 minutes by healow video for this TeleMed visit with time reviewing their prior and recent records and labs and discussing their current status and future plans for care for the patient.

## 2023-02-01 ENCOUNTER — LAB OUTSIDE AN ENCOUNTER (OUTPATIENT)
Dept: URBAN - METROPOLITAN AREA TELEHEALTH 2 | Facility: TELEHEALTH | Age: 63
End: 2023-02-01

## 2023-02-14 ENCOUNTER — LAB OUTSIDE AN ENCOUNTER (OUTPATIENT)
Dept: URBAN - METROPOLITAN AREA TELEHEALTH 2 | Facility: TELEHEALTH | Age: 63
End: 2023-02-14

## 2023-02-16 ENCOUNTER — TELEPHONE ENCOUNTER (OUTPATIENT)
Dept: URBAN - METROPOLITAN AREA CLINIC 92 | Facility: CLINIC | Age: 63
End: 2023-02-16

## 2023-02-16 NOTE — HPI-TODAY'S VISIT:
Reviewed epic notes and saw that the patient has not had his MRI done yet and that is scheduled for March 13, 2023. He saw Dr. Hughes in hematology on January 23, 2023 with labs showing ferritin 78 iron sat 32% sodium 140 potassium 5.0 chloride 105 CO2 of 28 calcium 9.6 BUN of 13 creatinine 0.75 glucose elevated 165 albumin 4.1 alk phos 95 AST 36 ALT 32 total bilirubin 0.8.  Her note mentions that the patient was recently started on anticoagulation due to the superior mesenteric and portal vein thrombosis and seen and platelet count was noted to be 73,000 at that time.  He is on apixaban 5 mg twice daily and his last MRI was in November 2022 and extremities issues.  They felt that he could continue anticoagulation as long as his platelet count remained above 50,000 and no bleeding was seen.  She was planning to see him again on approximately July 24, 2023

## 2023-02-17 ENCOUNTER — OFFICE VISIT (OUTPATIENT)
Dept: URBAN - METROPOLITAN AREA TELEHEALTH 2 | Facility: TELEHEALTH | Age: 63
End: 2023-02-17

## 2023-02-17 RX ORDER — LOPERAMIDE HYDROCHLORIDE 2 MG/1
1 TABLET AS NEEDED CAPSULE ORAL
Status: ACTIVE | COMMUNITY

## 2023-02-17 RX ORDER — VITAMIN A 2400 MCG
AS DIRECTED CAPSULE ORAL ONCE A DAY
Status: ACTIVE | COMMUNITY

## 2023-02-17 RX ORDER — NADOLOL 40 MG/1
1 TABLET TABLET ORAL ONCE A DAY
Qty: 30 | Refills: 6 | Status: ACTIVE | COMMUNITY
Start: 2022-12-08

## 2023-02-17 RX ORDER — PRAVASTATIN SODIUM 40 MG/1
1 TABLET TABLET ORAL ONCE A DAY
Status: ACTIVE | COMMUNITY

## 2023-02-17 RX ORDER — DRONABINOL 2.5 MG/1
1 CAPSULE IN THE EVENING OR AT BEDTIME CAPSULE ORAL
Status: ACTIVE | COMMUNITY

## 2023-02-17 RX ORDER — ACYCLOVIR 200 MG/1
1 CAPSULE CAPSULE ORAL TWICE A DAY
Status: ACTIVE | COMMUNITY

## 2023-02-17 RX ORDER — OMEPRAZOLE 40 MG/1
1 CAPSULE 30 MINUTES BEFORE MORNING MEAL CAPSULE, DELAYED RELEASE ORAL ONCE A DAY
Status: ACTIVE | COMMUNITY

## 2023-02-17 RX ORDER — IMIQUIMOD 50 MG/G
1 APPLICATION AT BEDTIME, LEAVE ON FOR 8 HOURS THEN WASH OFF CREAM TOPICAL
Status: ACTIVE | COMMUNITY

## 2023-02-17 RX ORDER — BICTEGRAVIR SODIUM, EMTRICITABINE, AND TENOFOVIR ALAFENAMIDE FUMARATE 50; 200; 25 MG/1; MG/1; MG/1
1 TABLET TABLET ORAL ONCE A DAY
Status: ACTIVE | COMMUNITY

## 2023-02-17 RX ORDER — PREGABALIN 75 MG/1
1 CAPSULE CAPSULE ORAL TWICE A DAY
Status: ACTIVE | COMMUNITY

## 2023-02-17 RX ORDER — LORATADINE 10 MG/1
1 TABLET TABLET ORAL ONCE A DAY
Status: ACTIVE | COMMUNITY

## 2023-02-17 NOTE — HPI-TODAY'S VISIT:
This is a 62 year old male referred  by Viry Serrano PA-C and last seen Dec 2022 for an evaluation of abnormal MRI with suspected cirrhosis/portal htn and new clot seen on scan.  A copy of this note will be sent to the referring provider.   Reviewed epic notes and saw that the patient has not had his MRI done yet and that is scheduled for March 13, 2023. He saw Dr. Hughes in hematology on January 23, 2023 with labs showing ferritin 78 iron sat 32% sodium 140 potassium 5.0 chloride 105 CO2 of 28 calcium 9.6 BUN of 13 creatinine 0.75 glucose elevated 165 albumin 4.1 alk phos 95 AST 36 ALT 32 total bilirubin 0.8.  Her note mentions that the patient was recently started on anticoagulation due to the superior mesenteric and portal vein thrombosis and seen and platelet count was noted to be 73,000 at that time.  He is on apixaban 5 mg twice daily and his last MRI was in November 2022 and extremities issues.  They felt that he could continue anticoagulation as long as his platelet count remained above 50,000 and no bleeding was seen.  She was planning to see him again on approximately July 24, 2023   Pt did the mri recently as ct had issues and confirmed the clot.   EXAM: MR ABDOMEN W AND WO CONTRAST  CLINICAL INDICATION: Cirrhosis Of Liver.  TECHNIQUE: Multisequence, multiplanar MRI of the abdomen was performed without and with intravenous contrast. [id number]  COMPARISON: 11/30/2021 MRI, 11/12/2022 CT   FINDINGS:  Evaluation is slightly limited by respiratory motion artifact and subsequent misregistration on subtraction imaging.   Lower Thorax: Normal.   Liver: No fat or iron. Morphologic changes of chronic liver disease. Evaluation for arterially enhancing lesions is limited due to early arterial phase of contrast with nonopacification of the portal vein, in part due to known portal venous thrombus. Within these confines: No arterially enhancing lesions. No lesions with washout or capsule.    Gallbladder/Biliary Tree: Normal.   Spleen: Enlarged measuring 16 cm.   Pancreas: Mildly atrophic without ductal dilation. No focal suspicious lesions.   Adrenal Glands: Normal.    Kidneys/Ureters: Renal cysts.   Gastrointestinal: No obstruction.    Lymph Nodes: Normal.   Vessels: Partial opacification of the superior mesenteric vein extending to the main portal vein, seen on prior CT. Near complete opacification of the left portal vein. The right portal vein branches are partially occluded centrally and grossly patent distally. Overall, venous occlusion appears to be secondary to bland thrombus in the main portal and superior mesenteric vein, despite presence of diffuse restricted diffusion. Evaluation for underlying enhancement is limited within the intrahepatic branches due to respiratory motion artifact and misregistration.   Splenic vein is patent. Small upper abdominal, perigastric and paraesophageal varices. Abdominal aorta is normal in course and caliber.   Peritoneum/Retroperitoneum: No significant ascites.   Bones/Soft Tissues: No aggressive osseous lesions.   IMPRESSION: 1. Morphologic changes of chronic liver disease. No definite suspicious liver lesions within the limitations described above. 2. 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 Findings are favored secondary to bland thrombus; however, in this high-risk patient, underlying isolated tumor thrombus is difficult to exclude given presence of restricted diffusion and  extensive respiratory motion artifact and misregistration which limits the use of subtraction imaging. Attention on short-term interval follow-up MRI is recommended. 3. Sequela of portal hypertension, as above.   Critical findings were communicated electronically with and acknowledged by Dr. Zach Menjivar on 11/14/2022 10:49 AM. The following impression point(s) have been communicated: Portal vein and SMV filling defect highly suspicious for thrombus.   The images were reviewed and interpreted by Reid Boykin MD. Addended by Reid Boykin MD on 11/14/2022 12:18 PM  Dr Hughes wanted to put him on blood thinner and we needed to see what Dr Terry said: She mentioned that she would proceed to AC and she wanted on beta blocker as possible. He had bveen banded x 3 by her recently.  He says he did not speak with her: he reaches her through the call office : [phone]    EXAM: CT ABDOMEN PELVIS W AND WO IV CONTRAST   CLINICAL INDICATION: R10.30: Lower abdominal pain, unspecified R10.13: Epigastric pain.   TECHNIQUE: Noncontrast images through the abdomen and pelvis were obtained. Following administration of non-ionic IV contrast, postcontrast images through the abdomen and pelvis were obtained. ESRC.[id number] If applicable, point-of-care testing?was approved following departmental protocol.   COMPARISON: MRI abdomen 11/30/2021.   FINDINGS:  Lower Thorax: Left upper lobe 4.5 x 3.8 mm hyperdensity (series 4, image 2), consistent with calcified granuloma.   Liver: Heterogeneous enhancement with underlying changes of chronic liver disease including mildly nodular contour and fissural widening. No suspicious focal lesions.   Gallbladder/Biliary Tree: Normal.   Spleen: Splenomegaly, with spleen measuring 16.5 cm.   Pancreas: Normal.   Adrenal Glands: Normal.   Kidneys/Ureters:  Symmetrically enhancing. No hydronephrosis. Multiple bilateral subcentimeter hypoattenuating lesions too small to further characterize.   Gastrointestinal: The appendix is not definitively seen. No bowel obstruction. Asymmetric right pericolic edema compared to the left, which is likely related to chronic liver disease/portal colopathy, however colitis is not excluded.   Bladder: Normal.   Prostate/Seminal Vesicles: Normal.   Lymph Nodes: No pathological lymphadenopathy.   Vessels:  Filling defect within the portal vein and distal superior mesenteric vein, possibly involving branches of the SMV and extending into the left portal vein. Small esophageal and perigastric varices. Minimal calcifications of the abdominal aorta, bilateral renal and internal iliac arteries.   Peritoneum/Retroperitoneum: Normal.   Bones/Soft Tissues: Tiny fat-containing umbilical hernia.   IMPRESSION: 1.  Portal vein and superior mesenteric vein filling defect, which is highly suspicious for thrombus. 2.  Asymmetric right pericolic edema compared to the left, which is likely related to chronic liver disease/portal colopathy. Colitis less likely. 3.  Morphologic changes of chronic liver disease. No suspicious focal hepatic lesions.   4.  Sequelae of portal hypertension, including splenomegaly and small upper abdominal varices.    The images were reviewed and interpreted by Reid Boykin MD.  Nov 1 db 0.21, IB 0.69. Wbc 5.1 hg 16.4 platelets 80 and mcv 92.7. Na 140 and k 4.4 an cl 103 and co2 29 and ca 9.4 and bun 17 and cr 0.7 and glu 151 and tp 7.7 and alb 4.1 and ast 36 and alt 35 and tb 0.9 and HIV 93. RPR neg. CD4 147 down from 192.  He was found to have the clot as few months ago said may have overstressed shoulder and had fall earlier and this was found some nonspecific pain in aug.  [place] [facility] [place] labs sent in: na 140 and k 4.8 and cl 101 and co2 29 and glu 203 elevated and please discuss with local provider.  TB 0.7 and alb 4.2 and ca 9.2. cr 0.72. Ast 37 and alt 38 (ideal alt less than 35). Alk 86.   Wbc 3.6 little low and hct 46.3 and plat 72 low. Prior other labs: cmp showed glucose 117 also up. Ast 44 and alt 41 so little lower now. Cbc not run and inr 1.1. Meld using the labs was 7 and meld na 7 so remains low.  Pt says he has high sugar and being watched.  June 2022 labs show that the CBC was clumped and so not able to be run.  June 6 ultrasound shows liver to be mildly coarsened with regards to the echotexture but with no lesions. Bile duct showed no dilated bile ducts and common duct was 3 mm. Gallbladder was normal. Pancreas was obscured by overlying structures. Right kidney 12.3 cm and left kidney 13.6 cm.  No hydronephrosis. Spleen enlarged at 16.7 cm. Liver vessels patent. Prior scan was an MRI from November 30 of last year that showed that the liver had mild morphologic changes of chronic liver disease with fissural widening and minimal nodular contour and no suspicious lesions.  Spleen was enlarged and also at 16 cm.  Small esophageal and perigastric varices were seen.   March 28, 2022 labs show IgM elevated slightly up to 247 with normal from 20 up to 172.  INR 1.1.  White blood cell count 4.4 hemoglobin 15.7 plate count 79 MCV 94 neutrophils 2.8 lymphocytes 0.9 glucose 91 BUN is 16 creatinine 0.73 sodium 138 potassium 4.5 albumin 4.3 bilirubin 0.7 alkaline phosphatase 83 AST 46 and ALT 48. Meld 7  and meld na 7.  He did the covid 19 booster last year he thinks 10-7-21 and has not done the 2nd booster.  January 17, 2022 labs show white blood cell count 4.9 hemoglobin 16.5 hematocrit 45.5 platelet count low at 72.  MCV normal at 92.  Neutrophils 2.9 lymphocytes 1.2.  Pt normally bp 127/90 range. He says not on meds for bp at this time. Maybe see primary to start a low dose beta blocker like carvedilol. That was recommended by Dr Terry as blood thinner and the varices.  Nov 17 2022 egd 3 columns grade 2 ev and no bleeding and incompletely eradicated with banding. SHe recomended egd in 2m.  EGD done by  on April 11, 2022 at [facility] shows 2 columns of grade 1 varices and one Grade 2 varices in the lower third of the esophagus.  Single 7 mm sessile polyp with no bleeding and no stigmata of recent bleeding seen at the cardia. It was removed with hot snare.  It was retrieved.  Two  6 to 10 mm sessile polyps with no bleeding seen on the lesser curvature of the stomach and removed with hot snare.  Mild inflammation characterized by the erythema and granularity seen of the gastric antrum duodenum normal.  Pathology of this came back as gastric polyps x3 with cauterized gastric mucosa with regenerative epithelial changes and cystic dilation with suggestive hyperplastic polyps.    January 25, 2022 EGD showed 2 columns grade 1 varices in 1: Grade 2 varices and diffuse moderate inflammation characterized by congestion erythema granularity of the gastric antrum.  Multiple 2 to 10 mm polyp seen with no bleeding.  January 25, 2022 colonoscopy showed entire colon was normal with nonbleeding rectal varices.  A single subcentimeter condyloma was seen.  Internal hemorrhoids not bleeding was seen at the endoscopy.  There were small in nature.  Patient was to be referred to colorectal surgeon.  He did see april 19 the rectal surgeon and they did a treatment for a condyloma.  November 30, 2021 MRI showed at [place] lower thorax normal.  Liver with no fat or iron but with mild morphologic changes chronic liver disease with fissural widening and minimal nodular contour and no suspicious lesions.  Splenomegaly to 16 cm seen.  Gallbladder/biliary tree normal.  Pancreas normal.  Kidneys normal.  Liver vessels patent.  Small esophageal and perigastric varices seen.    PT Jan 2022 seen by Ms Ruelas LAURENT for TH.  he is on mvi gummies which has 2000% biotin in it. would recommend he avoid. Also on mobic for a year or so, recommend he avoid this as well.   labs 1/3/22: na 138 alt 41 ast 42 alp 77tb 0.7 wbc 4.5 hgb 16.2 plt low 75      RECAP: He has a hx of HIV diagnosed in 1986- treatment initiated in 1996 and follows with Jeffery Hawk PA-C. and Dr Matthew.  Currently on Biktarvy since 01/2020.  Prezcobix was recently discontinued about 2 months ago as concern this was contributing to thrombocytopenia.   He has been seeing hematology (Dr. Yvette Hughes) for Thrombocytopenia: ranges from 139K in 1/28/2013 to 114K in 1/2018 when stabilized and dropped to the 90's in February 2020. Concern of drug related thrombocytopenia with Biktarvy and Prezcobix. CD4 is below 200 and so on Atovaquone (Mepron) for PCP prophylaxis.    MRI 12/2/2021 Liver: No fat or iron. Mild morphologic changes of chronic liver disease with fissural widening and minimal nodular contour. No suspicious lesions.   Patent portal vein, with sequela of portal hypertension, including splenomegaly and small upper abdominal varices.  No personal hx of fatty liver, hepatitis, or other liver disease.  No family hx of liver disease.  Records indicate hep c and hep b negative 2014.  Hep B core+ as of 11/2021 but otherwise hepatitis work up negative.  LFTs in [place] reviewed from 11/2021 and WNL (alt 39, alp 76, ast 37) as are his Fe+ studies. Plt 85 11/15/21.   INR 1.08 10/2021.    Hx of hld currently on pravastatin 2017.   No hx of DM and last a1c 5.4%.  Weight has been fairly stable at about 170-175 over past 6 years, was up to max of 210 in late 1990s.  Summer 2022 175 and Winter 2022 to 170.  No alcohol use.    Marijuana use daily.  Some reports of this causing inc lfts and inc fibrosis. Still doing that.  Denies other drug or tobacco use.  Denies use of otc herbals/supplements.  He has a hx of osteoporosis with last DEXA November 2021 improved, s/p 3 Reclast infusions, follows with Dr. Namita Long endocrinology. He says not seen then in a while and he will check in.  Seem by Viry Serrano PA-C, 12/8/21 for fecal occult +. 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 Denies melena, hematochezia, N/V. Notes abdominal gurgling for years with ARVs without triggers. No anorexia or weight loss. Denies GERD and dysphagia.  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 No prior EGD.    Plan: 1. We will reach out to Dr Hughes re the pt and the need to anticoagulate and Dr Terry felt he should be AC, 2. He will see primary re the beta blocker like carvedilol low dose. 3. If issues then with hr less than 60 or sbp les 100 to hold it. 4. He will need to see Dr Terry re next plans re the varices and the eventual. 5. Plan for the mri feb 14 to follow up on this. 6. Pt will see us in Late feb to check on this.   Stressed to pt the need for social distancing and strict handwashing and wearing a mask and to follow any other new or added CDC recommendations as this is an evolving target.  Duration of the visit was minutes with 10 minutes of chart prep and 20 minutes by healow video for this TeleMed visit with time reviewing their prior and recent records and labs and discussing their current status and future plans for care for the patient.

## 2023-03-01 ENCOUNTER — OFFICE VISIT (OUTPATIENT)
Dept: URBAN - METROPOLITAN AREA TELEHEALTH 2 | Facility: TELEHEALTH | Age: 63
End: 2023-03-01
Payer: COMMERCIAL

## 2023-03-01 VITALS — HEIGHT: 69 IN | WEIGHT: 169 LBS | BODY MASS INDEX: 25.03 KG/M2

## 2023-03-01 DIAGNOSIS — K76.6 PORTAL HYPERTENSION: ICD-10-CM

## 2023-03-01 DIAGNOSIS — I85.10 ESOPH VARICE OTHER DIS: ICD-10-CM

## 2023-03-01 DIAGNOSIS — K74.69 CIRRHOSIS, CRYPTOGENIC: ICD-10-CM

## 2023-03-01 DIAGNOSIS — I81 PORTAL VEIN THROMBOSIS: ICD-10-CM

## 2023-03-01 PROBLEM — 736687002: Status: ACTIVE | Noted: 2021-12-09

## 2023-03-01 PROBLEM — 267434003: Status: ACTIVE | Noted: 2021-12-13

## 2023-03-01 PROBLEM — 19943007: Status: ACTIVE | Noted: 2021-12-13

## 2023-03-01 PROBLEM — 86406008: Status: ACTIVE | Noted: 2021-12-13

## 2023-03-01 PROBLEM — 161646004 H/O: HIGH RISK MEDICATION: Status: ACTIVE | Noted: 2022-04-25

## 2023-03-01 PROBLEM — 59614000: Status: ACTIVE | Noted: 2021-12-09

## 2023-03-01 PROBLEM — 231504006: Status: ACTIVE | Noted: 2021-12-13

## 2023-03-01 PROBLEM — 64859006: Status: ACTIVE | Noted: 2021-12-13

## 2023-03-01 PROBLEM — 80394007 HYPERGLYCEMIA: Status: ACTIVE | Noted: 2022-07-22

## 2023-03-01 PROBLEM — 17920008 PORTAL VEIN THROMBOSIS: Status: ACTIVE | Noted: 2022-12-06

## 2023-03-01 PROBLEM — 35489007: Status: ACTIVE | Noted: 2021-12-13

## 2023-03-01 PROBLEM — 415116008: Status: ACTIVE | Noted: 2021-12-08

## 2023-03-01 PROBLEM — 197001004 SUPERIOR MESENTERIC VEIN THROMBOSIS: Status: ACTIVE | Noted: 2022-12-06

## 2023-03-01 PROBLEM — 416200006: Status: ACTIVE | Noted: 2021-12-09

## 2023-03-01 PROBLEM — 14223005: Status: ACTIVE | Noted: 2021-12-08

## 2023-03-01 PROBLEM — 707724006: Status: ACTIVE | Noted: 2021-12-09

## 2023-03-01 PROBLEM — 34742003: Status: ACTIVE | Noted: 2021-12-13

## 2023-03-01 PROCEDURE — 99212 OFFICE O/P EST SF 10 MIN: CPT | Performed by: INTERNAL MEDICINE

## 2023-03-01 RX ORDER — NADOLOL 40 MG/1
1 TABLET TABLET ORAL ONCE A DAY
Qty: 30 | Refills: 6 | Status: ACTIVE | COMMUNITY
Start: 2022-12-08

## 2023-03-01 RX ORDER — BICTEGRAVIR SODIUM, EMTRICITABINE, AND TENOFOVIR ALAFENAMIDE FUMARATE 50; 200; 25 MG/1; MG/1; MG/1
1 TABLET TABLET ORAL ONCE A DAY
Status: ACTIVE | COMMUNITY

## 2023-03-01 RX ORDER — PREGABALIN 75 MG/1
1 CAPSULE CAPSULE ORAL TWICE A DAY
Status: ACTIVE | COMMUNITY

## 2023-03-01 RX ORDER — OMEPRAZOLE 40 MG/1
1 CAPSULE 30 MINUTES BEFORE MORNING MEAL CAPSULE, DELAYED RELEASE ORAL ONCE A DAY
Status: ACTIVE | COMMUNITY

## 2023-03-01 RX ORDER — ACYCLOVIR 200 MG/1
1 CAPSULE CAPSULE ORAL TWICE A DAY
Status: ACTIVE | COMMUNITY

## 2023-03-01 RX ORDER — DRONABINOL 2.5 MG/1
1 CAPSULE IN THE EVENING OR AT BEDTIME CAPSULE ORAL
Status: ACTIVE | COMMUNITY

## 2023-03-01 RX ORDER — IMIQUIMOD 50 MG/G
1 APPLICATION AT BEDTIME, LEAVE ON FOR 8 HOURS THEN WASH OFF CREAM TOPICAL
Status: ACTIVE | COMMUNITY

## 2023-03-01 RX ORDER — PRAVASTATIN SODIUM 40 MG/1
1 TABLET TABLET ORAL ONCE A DAY
Status: ACTIVE | COMMUNITY

## 2023-03-01 RX ORDER — LOPERAMIDE HYDROCHLORIDE 2 MG/1
1 TABLET AS NEEDED CAPSULE ORAL
Status: ACTIVE | COMMUNITY

## 2023-03-01 RX ORDER — LORATADINE 10 MG/1
1 TABLET TABLET ORAL ONCE A DAY
Status: ACTIVE | COMMUNITY

## 2023-03-01 RX ORDER — VITAMIN A 2400 MCG
AS DIRECTED CAPSULE ORAL ONCE A DAY
Status: ACTIVE | COMMUNITY

## 2023-03-01 NOTE — HPI-TODAY'S VISIT:
This a 61 yo male with a PMHx of HIV, cirrhosis of the liver, esophageal varices and portal vein thrombus.  He underwent banding of esophageal varices November 2022.  Due to pain from the procedure the patient has decided not to have a 2nd banding.  He last 12 pounds in 3 days becuase he was not able to eat.  He was still in some discomfort when he was scheduled for a repeat exam one month later.  He is scheduled for a repeat MRI 3/13/2022.  He would like to see if the thrombus has decreased in size and if the varices are no longer seen on imaging.

## 2023-03-30 ENCOUNTER — TELEPHONE ENCOUNTER (OUTPATIENT)
Dept: URBAN - METROPOLITAN AREA CLINIC 86 | Facility: CLINIC | Age: 63
End: 2023-03-30

## 2023-03-30 NOTE — HPI-TODAY'S VISIT:
Dear Komal Garza pulled the labs and imaging for your visit from [place] for us: January 23 recent labs show white blood cell count 4.0 which is slightly low hemoglobin elevated at 16.6 hematocrit elevated at 48.2 and platelet count diminished at 73.  Previously your platelet count had been 85 and hemoglobin had been 15.5 in December.  WBC had been 4.6 back in December as well. Neutrophils were 2.27 lymphocytes 1.03 which were normal. Sodium was 140 potassium 5.0 chloride 105 CO2 of 28 BUN 13 creatinine 0.75 glucose was 165.  Albumin was 4.1.  Alkaline phosphatase 95 AST was 36 ALT 32 total bilirubin 0.8.  Previously your AST was 36 ALT of 35 so these appear to be quite stable. Your iron saturation was normal at 32% and ferritin was normal at 78. NEW MRI:  Your recent March 13 MRI showed lower thorax had clear lung bases.   Liver had morphologic changes of chronic liver disease with no suspicious liver lesions. Unremarkable gallbladder was seen with no intra extrahepatic bile duct dilation. Spleen was enlarged at 16 cm. Pancreas showed no main ductal dilation. Adrenal glands showed no adrenal nodules. Kidney showed no hydronephrosis.  Bilateral renal cysts were seen. Lymph nodes showed no suspicious lymph nodes by size criteria. The vessels showed interval decrease significantly in the known SMV and portal vein thrombosis with minimal residual filling defects seen in the portal vein and no filling defects seen in the SMV.  Bilateral portal veins were patent.  Patent splenic vein was seen.  Upper abdominal varices were seen. Multilevel degenerative changes seen in the spine with no suspicious osseous lesions. Overall, they mention you still had signs of cirrhosis with manifestations of portal hypertension without suspicious lesions and a significant interval resolution of the SMV thrombosis with minimal residual filling defect seen in the main portal vein now and patent splenic veins. I would recommend that we repeat the scan again in approximately 4 months to make sure that it continues to show further improvement and complete resolution if possible but overall very happy to see what it reported and please share with local providers. Dr. Dominguez

## 2023-03-31 ENCOUNTER — CLAIMS CREATED FROM THE CLAIM WINDOW (OUTPATIENT)
Dept: URBAN - METROPOLITAN AREA TELEHEALTH 2 | Facility: TELEHEALTH | Age: 63
End: 2023-03-31
Payer: COMMERCIAL

## 2023-03-31 VITALS
DIASTOLIC BLOOD PRESSURE: 86 MMHG | BODY MASS INDEX: 25.92 KG/M2 | TEMPERATURE: 98.2 F | HEART RATE: 72 BPM | WEIGHT: 175 LBS | SYSTOLIC BLOOD PRESSURE: 122 MMHG | HEIGHT: 69 IN

## 2023-03-31 DIAGNOSIS — R93.5 ABNORMAL MRI OF ABDOMEN: ICD-10-CM

## 2023-03-31 DIAGNOSIS — B20 HIV INFECTION, UNSPECIFIED SYMPTOM STATUS: ICD-10-CM

## 2023-03-31 DIAGNOSIS — D69.6 THROMBOCYTOPENIA: ICD-10-CM

## 2023-03-31 DIAGNOSIS — R74.8 ELEVATED LIVER ENZYMES: ICD-10-CM

## 2023-03-31 DIAGNOSIS — K55.069 SUPERIOR MESENTERIC VEIN THROMBOSIS: ICD-10-CM

## 2023-03-31 DIAGNOSIS — M81.0 OSTEOPOROSIS WITHOUT CURRENT PATHOLOGICAL FRACTURE, UNSPECIFIED OSTEOPOROSIS TYPE: ICD-10-CM

## 2023-03-31 DIAGNOSIS — E78.2 MIXED HYPERLIPIDEMIA: ICD-10-CM

## 2023-03-31 DIAGNOSIS — Z79.899 HIGH RISK MEDICATION USE: ICD-10-CM

## 2023-03-31 DIAGNOSIS — I85.00 ESOPHAGEAL VARICES WITHOUT BLEEDING, UNSPECIFIED ESOPHAGEAL VARICES TYPE: ICD-10-CM

## 2023-03-31 DIAGNOSIS — F41.9 ANXIETY DISORDER, UNSPECIFIED: ICD-10-CM

## 2023-03-31 DIAGNOSIS — I81 PORTAL VEIN THROMBOSIS: ICD-10-CM

## 2023-03-31 DIAGNOSIS — F32.A DEPRESSION, UNSPECIFIED: ICD-10-CM

## 2023-03-31 DIAGNOSIS — R76.8 HEPATITIS B CORE ANTIBODY POSITIVE: ICD-10-CM

## 2023-03-31 DIAGNOSIS — K74.69 CIRRHOSIS, CRYPTOGENIC: ICD-10-CM

## 2023-03-31 DIAGNOSIS — K74.60 CIRRHOSIS OF LIVER WITHOUT ASCITES, UNSPECIFIED HEPATIC CIRRHOSIS TYPE: ICD-10-CM

## 2023-03-31 DIAGNOSIS — K76.6 PORTAL HYPERTENSION: ICD-10-CM

## 2023-03-31 DIAGNOSIS — R19.5 OCCULT BLOOD POSITIVE STOOL: ICD-10-CM

## 2023-03-31 DIAGNOSIS — R73.09 ELEVATED GLUCOSE: ICD-10-CM

## 2023-03-31 PROCEDURE — 99214 OFFICE O/P EST MOD 30 MIN: CPT

## 2023-03-31 RX ORDER — IMIQUIMOD 50 MG/G
1 APPLICATION AT BEDTIME, LEAVE ON FOR 8 HOURS THEN WASH OFF CREAM TOPICAL
Status: ACTIVE | COMMUNITY

## 2023-03-31 RX ORDER — OMEPRAZOLE 40 MG/1
1 CAPSULE 30 MINUTES BEFORE MORNING MEAL CAPSULE, DELAYED RELEASE ORAL ONCE A DAY
Status: ACTIVE | COMMUNITY

## 2023-03-31 RX ORDER — LOPERAMIDE HYDROCHLORIDE 2 MG/1
1 TABLET AS NEEDED CAPSULE ORAL
Status: ACTIVE | COMMUNITY

## 2023-03-31 RX ORDER — PREGABALIN 75 MG/1
1 CAPSULE CAPSULE ORAL TWICE A DAY
Status: ACTIVE | COMMUNITY

## 2023-03-31 RX ORDER — DRONABINOL 2.5 MG/1
1 CAPSULE IN THE EVENING OR AT BEDTIME CAPSULE ORAL
Status: ACTIVE | COMMUNITY

## 2023-03-31 RX ORDER — PRAVASTATIN SODIUM 40 MG/1
1 TABLET TABLET ORAL ONCE A DAY
Status: ACTIVE | COMMUNITY

## 2023-03-31 RX ORDER — NADOLOL 40 MG/1
1 TABLET TABLET ORAL ONCE A DAY
Qty: 30 | Refills: 6 | Status: DISCONTINUED | COMMUNITY
Start: 2022-12-08

## 2023-03-31 RX ORDER — VITAMIN A 2400 MCG
AS DIRECTED CAPSULE ORAL ONCE A DAY
Status: ACTIVE | COMMUNITY

## 2023-03-31 RX ORDER — LORATADINE 10 MG/1
1 TABLET TABLET ORAL ONCE A DAY
Status: ACTIVE | COMMUNITY

## 2023-03-31 RX ORDER — ACYCLOVIR 200 MG/1
1 CAPSULE CAPSULE ORAL TWICE A DAY
Status: ACTIVE | COMMUNITY

## 2023-03-31 RX ORDER — BICTEGRAVIR SODIUM, EMTRICITABINE, AND TENOFOVIR ALAFENAMIDE FUMARATE 50; 200; 25 MG/1; MG/1; MG/1
1 TABLET TABLET ORAL ONCE A DAY
Status: ACTIVE | COMMUNITY

## 2023-03-31 NOTE — HPI-TODAY'S VISIT:
This is a 62 year old male referred  by Viry Serrano PA-C and last seen Dec 2022 for an evaluation of abnormal MRI with suspected cirrhosis/portal htn and new clot seen on scan.  A copy of this note will be sent to the referring provider.  Komal pulled the labs and imaging for your visit from [facility] for us: January 23 recent labs show white blood cell count 4.0 which is slightly low hemoglobin elevated at 16.6 hematocrit elevated at 48.2 (sees heme) and platelet count diminished at 73.  Previously your platelet count had been 85 and hemoglobin had been 15.5 in December.  WBC had been 4.6 back in December as well. Neutrophils were 2.27 lymphocytes 1.03 which were normal. Sodium was 140 potassium 5.0 chloride 105 CO2 of 28 BUN 13 creatinine 0.75 glucose was 165.  Albumin was 4.1.  Alkaline phosphatase 95 AST was 36 ALT 32 total bilirubin 0.8.  Previously your AST was 36 ALT of 35 so these appear to be quite stable. Your iron saturation was normal at 32% and ferritin was normal at 78.  NEW MRI:  Your recent March 13 MRI showed lower thorax had clear lung bases.   Liver had morphologic changes of chronic liver disease with no suspicious liver lesions. Unremarkable gallbladder was seen with no intra extrahepatic bile duct dilation. Spleen was enlarged at 16 cm. Pancreas showed no main ductal dilation. Adrenal glands showed no adrenal nodules. Kidney showed no hydronephrosis.  Bilateral renal cysts were seen. Lymph nodes showed no suspicious lymph nodes by size criteria. The vessels showed interval decrease significantly in the known SMV and portal vein thrombosis with minimal residual filling defects seen in the portal vein and no filling defects seen in the SMV.  Bilateral portal veins were patent.  Patent splenic vein was seen.  Upper abdominal varices were seen. Multilevel degenerative changes seen in the spine with no suspicious osseous lesions. Overall, they mention you still had signs of cirrhosis with manifestations of portal hypertension without suspicious lesions and a significant interval resolution of the SMV thrombosis with minimal residual filling defect seen in the main portal vein now and patent splenic veins. I would recommend that we repeat the scan again in approximately 4 months to make sure that it continues to show further improvement and complete resolution if possible but overall very happy to see what it reported and please share with local providers.  He was started on propanolol and eliquis x 3 months.  He sees Hematology 2 x a year.  Narrative & Impression EXAM: MR ABDOMEN W AND WO CONTRAST   CLINICAL INDICATION: cirrhosis.   TECHNIQUE: Multisequence, multiplanar MRI of the abdomen was performed without and with intravenous contrast. ESRC.[id number]   COMPARISON: 11/29/2022   FINDINGS:  Lower Thorax: Clear lung base.   Liver: Morphologic changes of chronic liver disease. No suspicious hepatic lesion.   Gallbladder/Biliary Tree: Unremarkable gallbladder. No intra or extrahepatic biliary ductal dilatation.   Spleen: Spleen is enlarged measuring up to 16 cm in the craniocaudal dimension.   Pancreas: No main ductal dilatation.   Adrenal Glands: No adrenal nodules.   Kidneys/Ureters: No hydronephrosis. Bilateral renal cysts.   Gastrointestinal: Nonobstructive bowel loops.    Lymph Nodes: No suspicious lymph nodes by size criteria.   Vessels: Since 11/29/2022, interval significant decrease in known SMV and portal vein thrombosis, with minimal residual filling defects in the portal vein and no filling defects in the SMV. Bilateral portal veins are patent. Patent splenic vein. Upper abdominal varices.   Peritoneum/Retroperitoneum: No free air or free fluid.   Bones/Soft Tissues: Multilevel degenerative changes in the spine. No suspicious osseous lesion. Unremarkable soft tissues.     IMPRESSION: 1.  Hepatic cirrhosis with manifestations of portal hypertension, without suspicious focal hepatic lesion. 2.  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 Patent splenic veins.   Epics notes: He saw Dr. Hughes in hematology on January 23, 2023 with labs showing ferritin 78 iron sat 32% sodium 140 potassium 5.0 chloride 105 CO2 of 28 calcium 9.6 BUN of 13 creatinine 0.75 glucose elevated 165 albumin 4.1 alk phos 95 AST 36 ALT 32 total bilirubin 0.8.  Her note mentions that the patient was recently started on anticoagulation due to the superior mesenteric and portal vein thrombosis and seen and platelet count was noted to be 73,000 at that time.  He is on apixaban 5 mg twice daily and his last MRI was in November 2022 and extremities issues.  They felt that  could continue anticoagulation as long as his platelet count remained above 50,000 and no bleeding was seen.  She was planning to see him again on approximately July 24, 2023  He should get the mri redone in early in [place] and so that would be available for when sees hematology.  EXAM: MR ABDOMEN W AND WO CONTRAST  CLINICAL INDICATION: Cirrhosis Of Liver.  TECHNIQUE: Multisequence, multiplanar MRI of the abdomen was performed without and with intravenous contrast. ESRC.[id number]  COMPARISON: 11/30/2021 MRI, 11/12/2022 CT   FINDINGS:  Evaluation is slightly limited by respiratory motion artifact and subsequent misregistration on subtraction imaging.   Lower Thorax: Normal.   Liver: No fat or iron. Morphologic changes of chronic liver disease. Evaluation for arterially enhancing lesions is limited due to early arterial phase of contrast with nonopacification of the portal vein, in part due to known portal venous thrombus. Within these confines: No arterially enhancing lesions. No lesions with washout or capsule.    Gallbladder/Biliary Tree: Normal.   Spleen: Enlarged measuring 16 cm.   Pancreas: Mildly atrophic without ductal dilation. No focal suspicious lesions.   Adrenal Glands: Normal.    Kidneys/Ureters: Renal cysts.   Gastrointestinal: No obstruction.    Lymph Nodes: Normal.   Vessels: Partial opacification of the superior mesenteric vein extending to the main portal vein, seen on prior CT. Near complete opacification of the left portal vein. The right portal vein branches are partially occluded centrally and grossly patent distally. Overall, venous occlusion appears to be secondary to bland thrombus in the main portal and superior mesenteric vein, despite presence of diffuse restricted diffusion. Evaluation for underlying enhancement is limited within the intrahepatic branches due to respiratory motion artifact and misregistration.   Splenic vein is patent. Small upper abdominal, perigastric and paraesophageal varices. Abdominal aorta is normal in course and caliber.   Peritoneum/Retroperitoneum: No significant ascites.   Bones/Soft Tissues: No aggressive osseous lesions.   IMPRESSION: 1. Morphologic changes of chronic liver disease. No definite suspicious liver lesions within the limitations described above. 2. 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 Findings are favored secondary to bland thrombus; however, in this high-risk patient, underlying isolated tumor thrombus is difficult to exclude given presence of restricted diffusion and  extensive respiratory motion artifact and misregistration which limits the use of subtraction imaging. Attention on short-term interval follow-up MRI is recommended. 3. Sequela of portal hypertension, as above.   Critical findings were communicated electronically with and acknowledged by Dr. Zach Menjivar on 11/14/2022 10:49 AM. The following impression point(s) have been communicated: Portal vein and SMV filling defect highly suspicious for thrombus.   The images were reviewed and interpreted by Reid Boykin MD. Addended by Reid Boykin MD on 11/14/2022 12:18 PM  Dr Hughes wanted to put him on blood thinner and we needed to see what Dr Terry said: She mentioned that she would proceed to AC and she wanted on beta blocker as possible. He had been banded x 3 by her recently.  He says he did not speak with her: he reaches her through the call office : [phone]  Last egd with Dr Terry was Nov 2022 and he needs to follow up with her re this. He is not ready to redo this as says it was painful.   EXAM: CT ABDOMEN PELVIS W AND WO IV CONTRAST   CLINICAL INDICATION: R10.30: Lower abdominal pain, unspecified R10.13: Epigastric pain.   TECHNIQUE: Noncontrast images through the abdomen and pelvis were obtained. Following administration of non-ionic IV contrast, postcontrast images through the abdomen and pelvis were obtained. ESRC.1.7.3 If applicable, point-of-care testing?was approved following departmental protocol.   COMPARISON: MRI abdomen 11/30/2021.   FINDINGS:  Lower Thorax: Left upper lobe 4.5 x 3.8 mm hyperdensity (series 4, image 2), consistent with calcified granuloma.   Liver: Heterogeneous enhancement with underlying changes of chronic liver disease including mildly nodular contour and fissural widening. No suspicious focal lesions.   Gallbladder/Biliary Tree: Normal.   Spleen: Splenomegaly, with spleen measuring 16.5 cm.   Pancreas: Normal.   Adrenal Glands: Normal.   Kidneys/Ureters:  Symmetrically enhancing. No hydronephrosis. Multiple bilateral subcentimeter hypoattenuating lesions too small to further characterize.   Gastrointestinal: The appendix is not definitively seen. No bowel obstruction. Asymmetric right pericolic edema compared to the left, which is likely related to chronic liver disease/portal colopathy, however colitis is not excluded.   Bladder: Normal.   Prostate/Seminal Vesicles: Normal.   Lymph Nodes: No pathological lymphadenopathy.   Vessels:  Filling defect within the portal vein and distal superior mesenteric vein, possibly involving branches of the SMV and extending into the left portal vein. Small esophageal and perigastric varices. Minimal calcifications of the abdominal aorta, bilateral renal and internal iliac arteries.   Peritoneum/Retroperitoneum: Normal.   Bones/Soft Tissues: Tiny fat-containing umbilical hernia.   IMPRESSION: 1.  Portal vein and superior mesenteric vein filling defect, which is highly suspicious for thrombus. 2.  Asymmetric right pericolic edema compared to the left, which is likely related to chronic liver disease/portal colopathy. Colitis less likely. 3.  Morphologic changes of chronic liver disease. No suspicious focal hepatic lesions.   4.  Sequelae of portal hypertension, including splenomegaly and small upper abdominal varices.    The images were reviewed and interpreted by Reid Boykin MD.  Nov 1 db 0.21, IB 0.69. Wbc 5.1 hg 16.4 platelets 80 and mcv 92.7. Na 140 and k 4.4 an cl 103 and co2 29 and ca 9.4 and bun 17 and cr 0.7 and glu 151 and tp 7.7 and alb 4.1 and ast 36 and alt 35 and tb 0.9 and HIV 93. RPR neg. CD4 147 down from 192.  He was found to have the clot as few months ago said may have overstressed shoulder and had fall earlier and this was found some nonspecific pain in aug.  June 6 [facility] labs sent in: na 140 and k 4.8 and cl 101 and co2 29 and glu 203 elevated and please discuss with local provider.  TB 0.7 and alb 4.2 and ca 9.2. cr 0.72. Ast 37 and alt 38 (ideal alt less than 35). Alk 86.   Wbc 3.6 little low and hct 46.3 and plat 72 low. Prior other labs: cmp showed glucose 117 also up. Ast 44 and alt 41 so little lower now. Cbc not run and inr 1.1. Meld using the labs was 7 and meld na 7 so remains low.  Pt says he has high sugar and being watched.  June 2022 labs show that the CBC was clumped and so not able to be run.  June 6 ultrasound shows liver to be mildly coarsened with regards to the echotexture but with no lesions. Bile duct showed no dilated bile ducts and common duct was 3 mm. Gallbladder was normal. Pancreas was obscured by overlying structures. Right kidney 12.3 cm and left kidney 13.6 cm.  No hydronephrosis. Spleen enlarged at 16.7 cm. Liver vessels patent. Prior scan was an MRI from November 30 of last year that showed that the liver had mild morphologic changes of chronic liver disease with fissural widening and minimal nodular contour and no suspicious lesions.  Spleen was enlarged and also at 16 cm.  Small esophageal and perigastric varices were seen.   March 28, 2022 labs show IgM elevated slightly up to 247 with normal from 20 up to 172.  INR 1.1.  White blood cell count 4.4 hemoglobin 15.7 plate count 79 MCV 94 neutrophils 2.8 lymphocytes 0.9 glucose 91 BUN is 16 creatinine 0.73 sodium 138 potassium 4.5 albumin 4.3 bilirubin 0.7 alkaline phosphatase 83 AST 46 and ALT 48. Meld 7  and meld na 7.  He did the covid 19 booster last year he thinks 10-7-21 and has not done the 2nd booster.  January 17, 2022 labs show white blood cell count 4.9 hemoglobin 16.5 hematocrit 45.5 platelet count low at 72.  MCV normal at 92.  Neutrophils 2.9 lymphocytes 1.2.  Pt normally bp 127/90 range. He says not on meds for bp at this time. Maybe see primary to start a low dose beta blocker like carvedilol. That was recommended by Dr Terry as blood thinner and the varices.  Nov 17 2022 egd 3 columns grade 2 ev and no bleeding and incompletely eradicated with banding. SHe recomended egd in 2m.  EGD done by  on April 11, 2022 at [facility] shows 2 columns of grade 1 varices and one Grade 2 varices in the lower third of the esophagus.  Single 7 mm sessile polyp with no bleeding and no stigmata of recent bleeding seen at the cardia. It was removed with hot snare.  It was retrieved.  Two  6 to 10 mm sessile polyps with no bleeding seen on the lesser curvature of the stomach and removed with hot snare.  Mild inflammation characterized by the erythema and granularity seen of the gastric antrum duodenum normal.  Pathology of this came back as gastric polyps x3 with cauterized gastric mucosa with regenerative epithelial changes and cystic dilation with suggestive hyperplastic polyps.    January 25, 2022 EGD showed 2 columns grade 1 varices in 1: Grade 2 varices and diffuse moderate inflammation characterized by congestion erythema granularity of the gastric antrum.  Multiple 2 to 10 mm polyp seen with no bleeding.  January 25, 2022 colonoscopy showed entire colon was normal with nonbleeding rectal varices.  A single subcentimeter condyloma was seen.  Internal hemorrhoids not bleeding was seen at the endoscopy.  There were small in nature.  Patient was to be referred to colorectal surgeon.  He did see april 19 the rectal surgeon and they did a treatment for a condyloma.  November 30, 2021 MRI showed at [facility] lower thorax normal.  Liver with no fat or iron but with mild morphologic changes chronic liver disease with fissural widening and minimal nodular contour and no suspicious lesions.  Splenomegaly to 16 cm seen.  Gallbladder/biliary tree normal.  Pancreas normal.  Kidneys normal.  Liver vessels patent.  Small esophageal and perigastric varices seen.    PT Jan 2022 seen by Ms Ruelas LAURENT for TH.  he is on mvi gummies which has 2000% biotin in it. would recommend he avoid. Also on mobic for a year or so, recommend he avoid this as well.   labs 1/3/22: na 138 alt 41 ast 42 alp 77tb 0.7 wbc 4.5 hgb 16.2 plt low 75      RECAP: He has a hx of HIV diagnosed in 1986- treatment initiated in 1996 and follows with Jeffery Hawk PA-C. and Dr Matthew.  Currently on Biktarvy since 01/2020.  Prezcobix was recently discontinued about 2 months ago as concern this was contributing to thrombocytopenia.   He has been seeing hematology (Dr. Yvette Hughes) for Thrombocytopenia: ranges from 139K in 1/28/2013 to 114K in 1/2018 when stabilized and dropped to the 90's in February 2020. Concern of drug related thrombocytopenia with Biktarvy and Prezcobix. CD4 is below 200 and so on Atovaquone (Mepron) for PCP prophylaxis.    MRI 12/2/2021 Liver: No fat or iron. Mild morphologic changes of chronic liver disease with fissural widening and minimal nodular contour. No suspicious lesions.   Patent portal vein, with sequela of portal hypertension, including splenomegaly and small upper abdominal varices.  No personal hx of fatty liver, hepatitis, or other liver disease.  No family hx of liver disease.  Records indicate hep c and hep b negative 2014.  Hep B core+ as of 11/2021 but otherwise hepatitis work up negative.  LFTs in [facility] reviewed from 11/2021 and WNL (alt 39, alp 76, ast 37) as are his Fe+ studies. Plt 85 11/15/21.   INR 1.08 10/2021.    Hx of hld currently on pravastatin 2017.   No hx of DM and last a1c 5.4%.  Weight has been fairly stable at about 170-175 over past 6 years, was up to max of 210 in late 1990s.  Summer 2022 175 and Winter 2022 to 170.  No alcohol use.    Marijuana use daily.  Some reports of this causing inc lfts and inc fibrosis. Still doing that.  Denies other drug or tobacco use.  Denies use of otc herbals/supplements.  He has a hx of osteoporosis with last DEXA November 2021 improved, s/p 3 Reclast infusions, follows with Dr. Namita Long endocrinology. He says not seen then in a while and he will check in.  Seem by Viry Serrano PA-C, 12/8/21 for fecal occult +. 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 Denies melena, hematochezia, N/V. Notes abdominal gurgling for years with ARVs without triggers. No anorexia or weight loss. Denies GERD and dysphagia.  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 No prior EGD.    Plan: 1. He remaining on AC for the clot hx through July when sees heme. 2. I would redo the mri in [place] early so that we can see how doing. 3. Pt will do ID ordered mri and Labs for meld score., 4. He will stay on beta blocker for the phtn. 5. Pt needs to consider seeing Dr Terry to discuss the issues.  Stressed to pt the need for social distancing and strict handwashing and wearing a mask and to follow any other new or added CDC recommendations as this is an evolving target.  Duration of the visit was 33 minutes with 10 minutes of chart prep and 23 minutes by healow video for this TeleMed visit with time reviewing their prior and recent records and labs and discussing their current status and future plans for care for the patient.

## 2023-08-01 ENCOUNTER — LAB OUTSIDE AN ENCOUNTER (OUTPATIENT)
Dept: URBAN - METROPOLITAN AREA TELEHEALTH 2 | Facility: TELEHEALTH | Age: 63
End: 2023-08-01

## 2023-08-04 ENCOUNTER — OFFICE VISIT (OUTPATIENT)
Dept: URBAN - METROPOLITAN AREA TELEHEALTH 2 | Facility: TELEHEALTH | Age: 63
End: 2023-08-04
Payer: COMMERCIAL

## 2023-08-04 VITALS — BODY MASS INDEX: 26.36 KG/M2 | WEIGHT: 178 LBS | HEIGHT: 69 IN

## 2023-08-04 DIAGNOSIS — K74.60 ADVANCED CIRRHOSIS: ICD-10-CM

## 2023-08-04 DIAGNOSIS — R19.5 OCCULT BLOOD POSITIVE STOOL: ICD-10-CM

## 2023-08-04 DIAGNOSIS — R76.8 HEPATITIS B CORE ANTIBODY POSITIVE: ICD-10-CM

## 2023-08-04 DIAGNOSIS — R74.8 ELEVATED LIVER ENZYMES: ICD-10-CM

## 2023-08-04 PROCEDURE — 99214 OFFICE O/P EST MOD 30 MIN: CPT

## 2023-08-04 RX ORDER — APIXABAN 2.5 MG/1
1 TABLET, FILM COATED ORAL TWICE A DAY
Status: ACTIVE | COMMUNITY

## 2023-08-04 RX ORDER — OMEPRAZOLE 40 MG/1
1 CAPSULE 30 MINUTES BEFORE MORNING MEAL CAPSULE, DELAYED RELEASE ORAL ONCE A DAY
Status: ACTIVE | COMMUNITY

## 2023-08-04 RX ORDER — LORATADINE 10 MG/1
1 TABLET TABLET ORAL ONCE A DAY
Status: ACTIVE | COMMUNITY

## 2023-08-04 RX ORDER — ACYCLOVIR 200 MG/1
1 CAPSULE CAPSULE ORAL TWICE A DAY
Status: ACTIVE | COMMUNITY

## 2023-08-04 RX ORDER — PREGABALIN 75 MG/1
1 CAPSULE CAPSULE ORAL TWICE A DAY
Status: ACTIVE | COMMUNITY

## 2023-08-04 RX ORDER — DRONABINOL 2.5 MG/1
1 CAPSULE IN THE EVENING OR AT BEDTIME CAPSULE ORAL
Status: ACTIVE | COMMUNITY

## 2023-08-04 RX ORDER — IMIQUIMOD 50 MG/G
1 APPLICATION AT BEDTIME, LEAVE ON FOR 8 HOURS THEN WASH OFF CREAM TOPICAL
Status: ACTIVE | COMMUNITY

## 2023-08-04 RX ORDER — LOPERAMIDE HYDROCHLORIDE 2 MG/1
1 TABLET AS NEEDED CAPSULE ORAL
Status: ACTIVE | COMMUNITY

## 2023-08-04 RX ORDER — PRAVASTATIN SODIUM 40 MG/1
1 TABLET TABLET ORAL ONCE A DAY
Status: ACTIVE | COMMUNITY

## 2023-08-04 RX ORDER — VITAMIN A 2400 MCG
AS DIRECTED CAPSULE ORAL ONCE A DAY
Status: ACTIVE | COMMUNITY

## 2023-08-04 RX ORDER — PROPRANOLOL HYDROCHLORIDE 10 MG/1
1 TABLET TABLET ORAL TWICE A DAY
Status: ACTIVE | COMMUNITY

## 2023-08-04 RX ORDER — BICTEGRAVIR SODIUM, EMTRICITABINE, AND TENOFOVIR ALAFENAMIDE FUMARATE 50; 200; 25 MG/1; MG/1; MG/1
1 TABLET TABLET ORAL ONCE A DAY
Status: ACTIVE | COMMUNITY

## 2023-08-04 NOTE — HPI-TODAY'S VISIT:
This is a 62 year old male referred  by Viry Serrano PA-C and last seen March 2022 for an evaluation of abnormal MRI with suspected cirrhosis/portal htn and new clot seen on scan.  A copy of this note will be sent to the referring provider.  July 24 labs show ferritin slightly higher at 100 from January when it was 78 and in June of last year was 42.  Iron saturation was 34% and back in May had been 42% and in January it had been 32%. Sodium 138 potassium 4.4 chloride 104 CO2 25 calcium 8.8 BUN of 14 creatinine 0.7 glucose was up at 183 albumin 3.8 total protein 7.8 alkaline phosphatase slightly up at 112 and had been previously normal at 88.  AST of 32 and ALT of 39 and previously AST 42 and ALT 46.  WBC 4.6 hemoglobin 15.6 platelet count 75 MCV 90.5. Asked if any recent issues to explain alk phos and he denied any issue of new meds. May 2023 HIV level was 40 and CD4 count was 161 and was previously 147.  July 25 saw Dr Hughes: summary she cut back eliquis dose to half. Iron deficiency anemia     Normal ferritin, off iron supplements        Thrombocytopenia  : Stable platelet count, asymptomatic  Patient can continue on anticoagulation as long as platelet count is above 50,000 and no bleeding Next follow up appointment in 6 months.  Portal and mesenteric vein thrombosis:  Elevated Ddimer as per today labs Despite Ddimer is elevated, I suggested decreasing apixaban to 2.5 mg BID. Although patient believes that fatigue is related to Apixaban, I suspect that is related to propranolol. He will discuss with Dr. Dominguez (hepatologist)  Re the beta blocker, option if this is not to be used is to do banding instead and he has concerns with doing the banding and that limits options. Beta blocker helps lower the risk for bleeding.  He does not want to do the banding again due to pain he had from that. He lost weight.  He could also do a diagnostic egd if the improvement of the clot has also led to improvement. That would be another option to discuss with Dr Terry.  July 6 mri: EXAM: MR ABDOMEN W AND WO CONTRAST   CLINICAL INDICATION: follow up SMV/PV thrombosis on previous hepatic imaging before hematology appt in July.   TECHNIQUE: Multisequence, multiplanar MRI of the abdomen was performed without and with intravenous contrast. ESRC.[id number]   COMPARISON: MRI abdomen 11/29/2022, and 3/13/2023   FINDINGS:  Lower Thorax: Normal.   Liver: No fat or iron. Unchanged morphologic changes of chronic liver disease without suspicious lesion.    Gallbladder/Biliary Tree: Normal.   Spleen: Splenomegaly measuring 16.6 cm in CC dimension, previously 16.3 cm in cc dimension 3/13/2023.   Pancreas: Normal.   Adrenal Glands: Normal.    Kidneys/Ureters: Unchanged bilateral simple renal cysts. No hydronephrosis.   Gastrointestinal: Normal.    Lymph Nodes: Normal.   Vessels: Residual peripheral nonocclusive thrombus noted in the distal portal vein and left hepatic vein (14:44 Stable varices.   Peritoneum/Retroperitoneum: Normal.   Bones/Soft Tissues: Degenerative changes of the spine. No aggressive osseous lesions.   IMPRESSION:   1. Nonocclusive residual peripheral thrombus in the main portal vein and left hepatic vein, with significant decrease of clot burden compared to March 2023. 2. Morphologic changes of chronic liver disease with stigmata of portal hypertension, without suspicious hepatic lesion.   The images were reviewed and interpreted by Mariya Farmer MD.  He may want to plan to do the mri in Nov which will be 4m later again and reassess how the residual clot is doing. If the clot is continuing to be better that may be doing better.  March 2023 mri: EXAM: MR ABDOMEN W AND WO CONTRAST   CLINICAL INDICATION: cirrhosis.   TECHNIQUE: Multisequence, multiplanar MRI of the abdomen was performed without and with intravenous contrast. ESRC.2.7.3   COMPARISON: 11/29/2022   FINDINGS:  Lower Thorax: Clear lung base.   Liver: Morphologic changes of chronic liver disease. No suspicious hepatic lesion.   Gallbladder/Biliary Tree: Unremarkable gallbladder. No intra or extrahepatic biliary ductal dilatation.   Spleen: Spleen is enlarged measuring up to 16 cm in the craniocaudal dimension.   Pancreas: No main ductal dilatation.   Adrenal Glands: No adrenal nodules.   Kidneys/Ureters: No hydronephrosis. Bilateral renal cysts.   Gastrointestinal: Nonobstructive bowel loops.    Lymph Nodes: No suspicious lymph nodes by size criteria.   Vessels: Since 11/29/2022, interval significant decrease in known SMV and portal vein thrombosis, with minimal residual filling defects in the portal vein and no filling defects in the SMV. Bilateral portal veins are patent. Patent splenic vein. Upper abdominal varices.   Peritoneum/Retroperitoneum: No free air or free fluid.   Bones/Soft Tissues: Multilevel degenerative changes in the spine. No suspicious osseous lesion. Unremarkable soft tissues.     IMPRESSION: 1.  Hepatic cirrhosis with manifestations of portal hypertension, without suspicious focal hepatic lesion. 2.  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 Patent splenic veins.   Komal pulled the labs and imaging for your visit from [facility] for us: January 23 recent labs show white blood cell count 4.0 which is slightly low hemoglobin elevated at 16.6 hematocrit elevated at 48.2 (sees heme) and platelet count diminished at 73.  Previously your platelet count had been 85 and hemoglobin had been 15.5 in December.  WBC had been 4.6 back in December as well. Neutrophils were 2.27 lymphocytes 1.03 which were normal. Sodium was 140 potassium 5.0 chloride 105 CO2 of 28 BUN 13 creatinine 0.75 glucose was 165.  Albumin was 4.1.  Alkaline phosphatase 95 AST was 36 ALT 32 total bilirubin 0.8.  Previously your AST was 36 ALT of 35 so these appear to be quite stable. Your iron saturation was normal at 32% and ferritin was normal at 78.  NEW MRI:  Your recent March 13 MRI showed lower thorax had clear lung bases.   Liver had morphologic changes of chronic liver disease with no suspicious liver lesions. Unremarkable gallbladder was seen with no intra extrahepatic bile duct dilation. Spleen was enlarged at 16 cm. Pancreas showed no main ductal dilation. Adrenal glands showed no adrenal nodules. Kidney showed no hydronephrosis.  Bilateral renal cysts were seen. Lymph nodes showed no suspicious lymph nodes by size criteria. The vessels showed interval decrease significantly in the known SMV and portal vein thrombosis with minimal residual filling defects seen in the portal vein and no filling defects seen in the SMV.  Bilateral portal veins were patent.  Patent splenic vein was seen.  Upper abdominal varices were seen. Multilevel degenerative changes seen in the spine with no suspicious osseous lesions. Overall, they mention you still had signs of cirrhosis with manifestations of portal hypertension without suspicious lesions and a significant interval resolution of the SMV thrombosis with minimal residual filling defect seen in the main portal vein now and patent splenic veins. I would recommend that we repeat the scan again in approximately 4 months to make sure that it continues to show further improvement and complete resolution if possible but overall very happy to see what it reported and please share with local providers.  He was started on propanolol and eliquis x 3 months.  He sees Hematology 2 x a year.  Narrative & Impression EXAM: MR ABDOMEN W AND WO CONTRAST   CLINICAL INDICATION: cirrhosis.   TECHNIQUE: Multisequence, multiplanar MRI of the abdomen was performed without and with intravenous contrast. ESRC.2.7.3   COMPARISON: 11/29/2022   FINDINGS:  Lower Thorax: Clear lung base.   Liver: Morphologic changes of chronic liver disease. No suspicious hepatic lesion.   Gallbladder/Biliary Tree: Unremarkable gallbladder. No intra or extrahepatic biliary ductal dilatation.   Spleen: Spleen is enlarged measuring up to 16 cm in the craniocaudal dimension.   Pancreas: No main ductal dilatation.   Adrenal Glands: No adrenal nodules.   Kidneys/Ureters: No hydronephrosis. Bilateral renal cysts.   Gastrointestinal: Nonobstructive bowel loops.    Lymph Nodes: No suspicious lymph nodes by size criteria.   Vessels: Since 11/29/2022, interval significant decrease in known SMV and portal vein thrombosis, with minimal residual filling defects in the portal vein and no filling defects in the SMV. Bilateral portal veins are patent. Patent splenic vein. Upper abdominal varices.   Peritoneum/Retroperitoneum: No free air or free fluid.   Bones/Soft Tissues: Multilevel degenerative changes in the spine. No suspicious osseous lesion. Unremarkable soft tissues.     IMPRESSION: 1.  Hepatic cirrhosis with manifestations of portal hypertension, without suspicious focal hepatic lesion. 2.  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 Patent splenic veins.   Epics notes: He saw Dr. Hughes in hematology on January 23, 2023 with labs showing ferritin 78 iron sat 32% sodium 140 potassium 5.0 chloride 105 CO2 of 28 calcium 9.6 BUN of 13 creatinine 0.75 glucose elevated 165 albumin 4.1 alk phos 95 AST 36 ALT 32 total bilirubin 0.8.  Her note mentions that the patient was recently started on anticoagulation due to the superior mesenteric and portal vein thrombosis and seen and platelet count was noted to be 73,000 at that time.  He is on apixaban 5 mg twice daily and his last MRI was in November 2022 and extremities issues.  They felt that  could continue anticoagulation as long as his platelet count remained above 50,000 and no bleeding was seen.  She was planning to see him again on approximately July 24, 2023  He should get the mri redone in early in JU[place] and so that would be available for when sees hematology.  EXAM: MR ABDOMEN W AND WO CONTRAST  CLINICAL INDICATION: Cirrhosis Of Liver.  TECHNIQUE: Multisequence, multiplanar MRI of the abdomen was performed without and with intravenous contrast. ESRC.2.7.3  COMPARISON: 11/30/2021 MRI, 11/12/2022 CT   FINDINGS:  Evaluation is slightly limited by respiratory motion artifact and subsequent misregistration on subtraction imaging.   Lower Thorax: Normal.   Liver: No fat or iron. Morphologic changes of chronic liver disease. Evaluation for arterially enhancing lesions is limited due to early arterial phase of contrast with nonopacification of the portal vein, in part due to known portal venous thrombus. Within these confines: No arterially enhancing lesions. No lesions with washout or capsule.    Gallbladder/Biliary Tree: Normal.   Spleen: Enlarged measuring 16 cm.   Pancreas: Mildly atrophic without ductal dilation. No focal suspicious lesions.   Adrenal Glands: Normal.    Kidneys/Ureters: Renal cysts.   Gastrointestinal: No obstruction.    Lymph Nodes: Normal.   Vessels: Partial opacification of the superior mesenteric vein extending to the main portal vein, seen on prior CT. Near complete opacification of the left portal vein. The right portal vein branches are partially occluded centrally and grossly patent distally. Overall, venous occlusion appears to be secondary to bland thrombus in the main portal and superior mesenteric vein, despite presence of diffuse restricted diffusion. Evaluation for underlying enhancement is limited within the intrahepatic branches due to respiratory motion artifact and misregistration.   Splenic vein is patent. Small upper abdominal, perigastric and paraesophageal varices. Abdominal aorta is normal in course and caliber.   Peritoneum/Retroperitoneum: No significant ascites.   Bones/Soft Tissues: No aggressive osseous lesions.   IMPRESSION: 1. Morphologic changes of chronic liver disease. No definite suspicious liver lesions within the limitations described above. 2. 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 Findings are favored secondary to bland thrombus; however, in this high-risk patient, underlying isolated tumor thrombus is difficult to exclude given presence of restricted diffusion and  extensive respiratory motion artifact and misregistration which limits the use of subtraction imaging. Attention on short-term interval follow-up MRI is recommended. 3. Sequela of portal hypertension, as above.   Critical findings were communicated electronically with and acknowledged by Dr. Zach Menjivar on 11/14/2022 10:49 AM. The following impression point(s) have been communicated: Portal vein and SMV filling defect highly suspicious for thrombus.   The images were reviewed and interpreted by Reid Boykin MD. Addended by Reid Boykin MD on 11/14/2022 12:18 PM  Dr Hughes wanted to put him on blood thinner and we needed to see what Dr Terry said: She mentioned that she would proceed to AC and she wanted on beta blocker as possible. He had been banded x 3 by her recently.  He says he did not speak with her: he reaches her through the call office : [phone]  Last egd with Dr Terry was Nov 2022 and he needs to follow up with her re this. He is not ready to redo this as says it was painful.   EXAM: CT ABDOMEN PELVIS W AND WO IV CONTRAST   CLINICAL INDICATION: R10.30: Lower abdominal pain, unspecified R10.13: Epigastric pain.   TECHNIQUE: Noncontrast images through the abdomen and pelvis were obtained. Following administration of non-ionic IV contrast, postcontrast images through the abdomen and pelvis were obtained. ESRC.1.7.3 If applicable, point-of-care testing?was approved following departmental protocol.   COMPARISON: MRI abdomen 11/30/2021.   FINDINGS:  Lower Thorax: Left upper lobe 4.5 x 3.8 mm hyperdensity (series 4, image 2), consistent with calcified granuloma.   Liver: Heterogeneous enhancement with underlying changes of chronic liver disease including mildly nodular contour and fissural widening. No suspicious focal lesions.   Gallbladder/Biliary Tree: Normal.   Spleen: Splenomegaly, with spleen measuring 16.5 cm.   Pancreas: Normal.   Adrenal Glands: Normal.   Kidneys/Ureters:  Symmetrically enhancing. No hydronephrosis. Multiple bilateral subcentimeter hypoattenuating lesions too small to further characterize.   Gastrointestinal: The appendix is not definitively seen. No bowel obstruction. Asymmetric right pericolic edema compared to the left, which is likely related to chronic liver disease/portal colopathy, however colitis is not excluded.   Bladder: Normal.   Prostate/Seminal Vesicles: Normal.   Lymph Nodes: No pathological lymphadenopathy.   Vessels:  Filling defect within the portal vein and distal superior mesenteric vein, possibly involving branches of the SMV and extending into the left portal vein. Small esophageal and perigastric varices. Minimal calcifications of the abdominal aorta, bilateral renal and internal iliac arteries.   Peritoneum/Retroperitoneum: Normal.   Bones/Soft Tissues: Tiny fat-containing umbilical hernia.   IMPRESSION: 1.  Portal vein and superior mesenteric vein filling defect, which is highly suspicious for thrombus. 2.  Asymmetric right pericolic edema compared to the left, which is likely related to chronic liver disease/portal colopathy. Colitis less likely. 3.  Morphologic changes of chronic liver disease. No suspicious focal hepatic lesions.   4.  Sequelae of portal hypertension, including splenomegaly and small upper abdominal varices.    The images were reviewed and interpreted by Reid Boykin MD.  Nov 1 db 0.21, IB 0.69. Wbc 5.1 hg 16.4 platelets 80 and mcv 92.7. Na 140 and k 4.4 an cl 103 and co2 29 and ca 9.4 and bun 17 and cr 0.7 and glu 151 and tp 7.7 and alb 4.1 and ast 36 and alt 35 and tb 0.9 and HIV 93. RPR neg. CD4 147 down from 192.  He was found to have the clot as few months ago said may have overstressed shoulder and had fall earlier and this was found some nonspecific pain in aug.  [facility] 6 [facility] labs sent in: na 140 and k 4.8 and cl 101 and co2 29 and glu 203 elevated and please discuss with local provider.  TB 0.7 and alb 4.2 and ca 9.2. cr 0.72. Ast 37 and alt 38 (ideal alt less than 35). Alk 86.   Wbc 3.6 little low and hct 46.3 and plat 72 low. Prior other labs: cmp showed glucose 117 also up. Ast 44 and alt 41 so little lower now. Cbc not run and inr 1.1. Meld using the labs was 7 and meld na 7 so remains low.  Pt says he has high sugar and being watched.  June 2022 labs show that the CBC was clumped and so not able to be run.  June 6 ultrasound shows liver to be mildly coarsened with regards to the echotexture but with no lesions. Bile duct showed no dilated bile ducts and common duct was 3 mm. Gallbladder was normal. Pancreas was obscured by overlying structures. Right kidney 12.3 cm and left kidney 13.6 cm.  No hydronephrosis. Spleen enlarged at 16.7 cm. Liver vessels patent. Prior scan was an MRI from November 30 of last year that showed that the liver had mild morphologic changes of chronic liver disease with fissural widening and minimal nodular contour and no suspicious lesions.  Spleen was enlarged and also at 16 cm.  Small esophageal and perigastric varices were seen.   March 28, 2022 labs show IgM elevated slightly up to 247 with normal from 20 up to 172.  INR 1.1.  White blood cell count 4.4 hemoglobin 15.7 plate count 79 MCV 94 neutrophils 2.8 lymphocytes 0.9 glucose 91 BUN is 16 creatinine 0.73 sodium 138 potassium 4.5 albumin 4.3 bilirubin 0.7 alkaline phosphatase 83 AST 46 and ALT 48. Meld 7  and meld na 7.  He did the covid 19 booster last year he thinks 10-7-21 and has not done the 2nd booster.  January 17, 2022 labs show white blood cell count 4.9 hemoglobin 16.5 hematocrit 45.5 platelet count low at 72.  MCV normal at 92.  Neutrophils 2.9 lymphocytes 1.2.  Pt normally bp 127/90 range. He says not on meds for bp at this time. Maybe see primary to start a low dose beta blocker like carvedilol. That was recommended by Dr Terry as blood thinner and the varices.  Nov 17 2022 egd 3 columns grade 2 ev and no bleeding and incompletely eradicated with banding. SHe recomended egd in 2m.  EGD done by  on April 11, 2022 at [facility] shows 2 columns of grade 1 varices and one Grade 2 varices in the lower third of the esophagus.  Single 7 mm sessile polyp with no bleeding and no stigmata of recent bleeding seen at the cardia. It was removed with hot snare.  It was retrieved.  Two  6 to 10 mm sessile polyps with no bleeding seen on the lesser curvature of the stomach and removed with hot snare.  Mild inflammation characterized by the erythema and granularity seen of the gastric antrum duodenum normal.  Pathology of this came back as gastric polyps x3 with cauterized gastric mucosa with regenerative epithelial changes and cystic dilation with suggestive hyperplastic polyps.    January 25, 2022 EGD showed 2 columns grade 1 varices in 1: Grade 2 varices and diffuse moderate inflammation characterized by congestion erythema granularity of the gastric antrum.  Multiple 2 to 10 mm polyp seen with no bleeding.  January 25, 2022 colonoscopy showed entire colon was normal with nonbleeding rectal varices.  A single subcentimeter condyloma was seen.  Internal hemorrhoids not bleeding was seen at the endoscopy.  There were small in nature.  Patient was to be referred to colorectal surgeon.  He did see april 19 the rectal surgeon and they did a treatment for a condyloma.  November 30, 2021 MRI showed at [facility] lower thorax normal.  Liver with no fat or iron but with mild morphologic changes chronic liver disease with fissural widening and minimal nodular contour and no suspicious lesions.  Splenomegaly to 16 cm seen.  Gallbladder/biliary tree normal.  Pancreas normal.  Kidneys normal.  Liver vessels patent.  Small esophageal and perigastric varices seen.    PT Jan 2022 seen by Ms Jessenia LAURENT for TH.  he is on mvi gummies which has 2000% biotin in it. would recommend he avoid. Also on mobic for a year or so, recommend he avoid this as well.   labs 1/3/22: na 138 alt 41 ast 42 alp 77tb 0.7 wbc 4.5 hgb 16.2 plt low 75      RECAP: He has a hx of HIV diagnosed in 1986- treatment initiated in 1996 and follows with Jeffery Hawk PA-C. and Dr Matthew.  Currently on Biktarvy since 01/2020.  Prezcobix was recently discontinued about 2 months ago as concern this was contributing to thrombocytopenia.   He has been seeing hematology (Dr. Yvette Hughes) for Thrombocytopenia: ranges from 139K in 1/28/2013 to 114K in 1/2018 when stabilized and dropped to the 90's in February 2020. Concern of drug related thrombocytopenia with Biktarvy and Prezcobix. CD4 is below 200 and so on Atovaquone (Mepron) for PCP prophylaxis.    MRI 12/2/2021 Liver: No fat or iron. Mild morphologic changes of chronic liver disease with fissural widening and minimal nodular contour. No suspicious lesions.   Patent portal vein, with sequela of portal hypertension, including splenomegaly and small upper abdominal varices.  No personal hx of fatty liver, hepatitis, or other liver disease.  No family hx of liver disease.  Records indicate hep c and hep b negative 2014.  Hep B core+ as of 11/2021 but otherwise hepatitis work up negative.  LFTs in [facility] reviewed from 11/2021 and WNL (alt 39, alp 76, ast 37) as are his Fe+ studies. Plt 85 11/15/21.   INR 1.08 10/2021.    Hx of hld currently on pravastatin 2017.   No hx of DM and last a1c 5.4%.  Weight has been fairly stable at about 170-175 over past 6 years, was up to max of 210 in late 1990s.  Summer 2022 175 and Winter 2022 to 170.  No alcohol use.    Marijuana use daily.  Some reports of this causing inc lfts and inc fibrosis. Still doing that.  Denies other drug or tobacco use.  Denies use of otc herbals/supplements.  He has a hx of osteoporosis with last DEXA November 2021 improved, s/p 3 Reclast infusions, follows with Dr. Namita Long endocrinology. He says not seen then in a while and he will check in.  Seem by Viry Serrano PA-C, 12/8/21 for fecal occult +. 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 Denies melena, hematochezia, N/V. Notes abdominal gurgling for years with ARVs without triggers. No anorexia or weight loss. Denies GERD and dysphagia.  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 No prior EGD.    Plan: 1. Happy to see that the anticoagulation is working and nonocclusive clot and smaller and need to see if on the next 4m mri how it looks. 2. If it resolves, then phtn will be better and the real quesiton is will the varices be gone. 3. Pt says he is having possible issues of the beta blocker and I encouraged him to meet in person with dr Terry to discuss options: relook egd to see if the varices are still there and if so as to size. If they are gone maybe could be off and if not could limited banding be planned to shrink them and avoid the need for the beta blocker. 4. He will do the mri and labs with Jeffery Hawk in the ID clinic as has the better coverage there for the tests. 5. Plan for visit post the mri in Nov with us.  Duration of the visit was 36 minutes with 10 minutes of chart prep and 26 minutes by healow video for this TeleMed visit with time reviewing their prior and recent records and labs and discussing their current status and future plans for care for the patient.

## 2023-11-02 ENCOUNTER — OFFICE VISIT (OUTPATIENT)
Dept: URBAN - METROPOLITAN AREA CLINIC 92 | Facility: CLINIC | Age: 63
End: 2023-11-02
Payer: COMMERCIAL

## 2023-11-02 ENCOUNTER — LAB OUTSIDE AN ENCOUNTER (OUTPATIENT)
Dept: URBAN - METROPOLITAN AREA CLINIC 92 | Facility: CLINIC | Age: 63
End: 2023-11-02

## 2023-11-02 ENCOUNTER — TELEPHONE ENCOUNTER (OUTPATIENT)
Dept: URBAN - METROPOLITAN AREA CLINIC 92 | Facility: CLINIC | Age: 63
End: 2023-11-02

## 2023-11-02 VITALS
DIASTOLIC BLOOD PRESSURE: 65 MMHG | WEIGHT: 179 LBS | HEIGHT: 69 IN | BODY MASS INDEX: 26.51 KG/M2 | TEMPERATURE: 97 F | SYSTOLIC BLOOD PRESSURE: 110 MMHG | HEART RATE: 64 BPM

## 2023-11-02 DIAGNOSIS — R73.09 ELEVATED GLUCOSE: ICD-10-CM

## 2023-11-02 DIAGNOSIS — D69.6 THROMBOCYTOPENIA: ICD-10-CM

## 2023-11-02 DIAGNOSIS — K55.069 SUPERIOR MESENTERIC VEIN THROMBOSIS: ICD-10-CM

## 2023-11-02 DIAGNOSIS — R74.8 ELEVATED LIVER ENZYMES: ICD-10-CM

## 2023-11-02 DIAGNOSIS — R19.5 OCCULT BLOOD POSITIVE STOOL: ICD-10-CM

## 2023-11-02 DIAGNOSIS — R76.8 HEPATITIS B CORE ANTIBODY POSITIVE: ICD-10-CM

## 2023-11-02 DIAGNOSIS — R93.5 ABNORMAL MRI OF ABDOMEN: ICD-10-CM

## 2023-11-02 DIAGNOSIS — E78.2 MIXED HYPERLIPIDEMIA: ICD-10-CM

## 2023-11-02 DIAGNOSIS — K76.6 PORTAL HYPERTENSION: ICD-10-CM

## 2023-11-02 DIAGNOSIS — F32.A DEPRESSION, UNSPECIFIED: ICD-10-CM

## 2023-11-02 DIAGNOSIS — B20 HIV INFECTION, UNSPECIFIED SYMPTOM STATUS: ICD-10-CM

## 2023-11-02 DIAGNOSIS — M81.0 OSTEOPOROSIS WITHOUT CURRENT PATHOLOGICAL FRACTURE, UNSPECIFIED OSTEOPOROSIS TYPE: ICD-10-CM

## 2023-11-02 DIAGNOSIS — I81 PORTAL VEIN THROMBOSIS: ICD-10-CM

## 2023-11-02 DIAGNOSIS — I49.9 IRREGULAR HEARTBEAT: ICD-10-CM

## 2023-11-02 DIAGNOSIS — K74.60 CIRRHOSIS OF LIVER WITHOUT ASCITES, UNSPECIFIED HEPATIC CIRRHOSIS TYPE: ICD-10-CM

## 2023-11-02 DIAGNOSIS — I85.00 ESOPHAGEAL VARICES WITHOUT BLEEDING, UNSPECIFIED ESOPHAGEAL VARICES TYPE: ICD-10-CM

## 2023-11-02 DIAGNOSIS — Z79.899 HIGH RISK MEDICATION USE: ICD-10-CM

## 2023-11-02 DIAGNOSIS — F41.9 ANXIETY DISORDER, UNSPECIFIED: ICD-10-CM

## 2023-11-02 PROBLEM — 361137007: Status: ACTIVE | Noted: 2023-11-02

## 2023-11-02 PROCEDURE — 99214 OFFICE O/P EST MOD 30 MIN: CPT | Performed by: INTERNAL MEDICINE

## 2023-11-02 RX ORDER — LORATADINE 10 MG/1
1 TABLET TABLET ORAL ONCE A DAY
Status: ACTIVE | COMMUNITY

## 2023-11-02 RX ORDER — DRONABINOL 2.5 MG/1
1 CAPSULE IN THE EVENING OR AT BEDTIME CAPSULE ORAL
Status: ACTIVE | COMMUNITY

## 2023-11-02 RX ORDER — APIXABAN 2.5 MG/1
1 TABLET, FILM COATED ORAL TWICE A DAY
Status: ACTIVE | COMMUNITY

## 2023-11-02 RX ORDER — PREGABALIN 75 MG/1
1 CAPSULE CAPSULE ORAL TWICE A DAY
Status: ACTIVE | COMMUNITY

## 2023-11-02 RX ORDER — ACYCLOVIR 200 MG/1
1 CAPSULE CAPSULE ORAL TWICE A DAY
Status: ACTIVE | COMMUNITY

## 2023-11-02 RX ORDER — VITAMIN A 2400 MCG
AS DIRECTED CAPSULE ORAL ONCE A DAY
Status: ACTIVE | COMMUNITY

## 2023-11-02 RX ORDER — PRAVASTATIN SODIUM 40 MG/1
1 TABLET TABLET ORAL ONCE A DAY
Status: ACTIVE | COMMUNITY

## 2023-11-02 RX ORDER — OMEPRAZOLE 40 MG/1
1 CAPSULE 30 MINUTES BEFORE MORNING MEAL CAPSULE, DELAYED RELEASE ORAL ONCE A DAY
Status: ACTIVE | COMMUNITY

## 2023-11-02 RX ORDER — LOPERAMIDE HYDROCHLORIDE 2 MG/1
1 TABLET AS NEEDED CAPSULE ORAL
Status: ACTIVE | COMMUNITY

## 2023-11-02 RX ORDER — BICTEGRAVIR SODIUM, EMTRICITABINE, AND TENOFOVIR ALAFENAMIDE FUMARATE 50; 200; 25 MG/1; MG/1; MG/1
1 TABLET TABLET ORAL ONCE A DAY
Status: ACTIVE | COMMUNITY

## 2023-11-02 RX ORDER — IMIQUIMOD 50 MG/G
1 APPLICATION AT BEDTIME, LEAVE ON FOR 8 HOURS THEN WASH OFF CREAM TOPICAL
Status: ACTIVE | COMMUNITY

## 2023-11-02 RX ORDER — PROPRANOLOL HYDROCHLORIDE 10 MG/1
1 TABLET TABLET ORAL TWICE A DAY
Status: ACTIVE | COMMUNITY

## 2023-11-02 NOTE — HPI-TODAY'S VISIT:
This is a 61 yo male with a PMHx of HIV and cirrhosis and esophageal varices who is here to schedule surveillance varices.  He underwent banding of esophageal varices March 2023.  Due to severe discomfort following the proceure he was very reluctant to have a repeat exam unitl now.  Patient is on Eliquis for portal vein thrombosis.

## 2023-11-02 NOTE — PHYSICAL EXAM RECTAL:
normal tone, no external hemorrhoids, no masses palpable, no red blood, Tenderness on ALEM, Internal hemorrhoids present

## 2023-11-02 NOTE — HPI-OTHER HISTORIES
(March 2023) This a 63 yo male with a PMHx of HIV, cirrhosis of the liver, esophageal varices and portal vein thrombus. He underwent banding of esophageal varices November 2022. Due to pain from the procedure the patient has decided not to have a 2nd banding. He last 12 pounds in 3 days becuase he was not able to eat. He was still in some discomfort when he was scheduled for a repeat exam one month later. He is scheduled for a repeat MRI 3/13/2022. He would like to see if the thrombus has decreased in size and if the varices are no longer seen on imaging.

## 2023-11-06 ENCOUNTER — LAB OUTSIDE AN ENCOUNTER (OUTPATIENT)
Dept: URBAN - METROPOLITAN AREA TELEHEALTH 2 | Facility: TELEHEALTH | Age: 63
End: 2023-11-06

## 2023-11-16 ENCOUNTER — WEB ENCOUNTER (OUTPATIENT)
Dept: URBAN - METROPOLITAN AREA CLINIC 92 | Facility: CLINIC | Age: 63
End: 2023-11-16

## 2023-11-29 ENCOUNTER — OFFICE VISIT (OUTPATIENT)
Dept: URBAN - METROPOLITAN AREA CLINIC 86 | Facility: CLINIC | Age: 63
End: 2023-11-29

## 2023-11-29 RX ORDER — PROPRANOLOL HYDROCHLORIDE 10 MG/1
1 TABLET TABLET ORAL TWICE A DAY
COMMUNITY

## 2023-11-29 RX ORDER — BICTEGRAVIR SODIUM, EMTRICITABINE, AND TENOFOVIR ALAFENAMIDE FUMARATE 50; 200; 25 MG/1; MG/1; MG/1
1 TABLET TABLET ORAL ONCE A DAY
COMMUNITY

## 2023-11-29 RX ORDER — PRAVASTATIN SODIUM 40 MG/1
1 TABLET TABLET ORAL ONCE A DAY
COMMUNITY

## 2023-11-29 RX ORDER — DRONABINOL 2.5 MG/1
1 CAPSULE IN THE EVENING OR AT BEDTIME CAPSULE ORAL
COMMUNITY

## 2023-11-29 RX ORDER — VITAMIN A 2400 MCG
AS DIRECTED CAPSULE ORAL ONCE A DAY
COMMUNITY

## 2023-11-29 RX ORDER — LOPERAMIDE HYDROCHLORIDE 2 MG/1
1 TABLET AS NEEDED CAPSULE ORAL
COMMUNITY

## 2023-11-29 RX ORDER — IMIQUIMOD 50 MG/G
1 APPLICATION AT BEDTIME, LEAVE ON FOR 8 HOURS THEN WASH OFF CREAM TOPICAL
COMMUNITY

## 2023-11-29 RX ORDER — ACYCLOVIR 200 MG/1
1 CAPSULE CAPSULE ORAL TWICE A DAY
COMMUNITY

## 2023-11-29 RX ORDER — LORATADINE 10 MG/1
1 TABLET TABLET ORAL ONCE A DAY
COMMUNITY

## 2023-11-29 RX ORDER — PREGABALIN 75 MG/1
1 CAPSULE CAPSULE ORAL TWICE A DAY
COMMUNITY

## 2023-11-29 RX ORDER — APIXABAN 2.5 MG/1
1 TABLET, FILM COATED ORAL TWICE A DAY
COMMUNITY

## 2023-11-29 RX ORDER — OMEPRAZOLE 40 MG/1
1 CAPSULE 30 MINUTES BEFORE MORNING MEAL CAPSULE, DELAYED RELEASE ORAL ONCE A DAY
COMMUNITY

## 2023-11-29 NOTE — HPI-TODAY'S VISIT:
This is a 63 year old male referred  by Viry Serrano PA-C and last seen Aug 2022 for an evaluation of abnormal MRI with suspected cirrhosis/portal htn and new clot seen on scan.  A copy of this note will be sent to the referring provider.  July 24 labs show ferritin slightly higher at 100 from January when it was 78 and in June of last year was 42.  Iron saturation was 34% and back in May had been 42% and in January it had been 32%. Sodium 138 potassium 4.4 chloride 104 CO2 25 calcium 8.8 BUN of 14 creatinine 0.7 glucose was up at 183 albumin 3.8 total protein 7.8 alkaline phosphatase slightly up at 112 and had been previously normal at 88.  AST of 32 and ALT of 39 and previously AST 42 and ALT 46.  WBC 4.6 hemoglobin 15.6 platelet count 75 MCV 90.5. Asked if any recent issues to explain alk phos and he denied any issue of new meds. May 2023 HIV level was 40 and CD4 count was 161 and was previously 147.  July 25 saw Dr Hughes: summary she cut back eliquis dose to half. Iron deficiency anemia     Normal ferritin, off iron supplements        Thrombocytopenia  : Stable platelet count, asymptomatic  Patient can continue on anticoagulation as long as platelet count is above 50,000 and no bleeding Next follow up appointment in 6 months.  Portal and mesenteric vein thrombosis:  Elevated Ddimer as per today labs Despite Ddimer is elevated, I suggested decreasing apixaban to 2.5 mg BID. Although patient believes that fatigue is related to Apixaban, I suspect that is related to propranolol. He will discuss with Dr. Dominguez (hepatologist)  Re the beta blocker, option if this is not to be used is to do banding instead and he has concerns with doing the banding and that limits options. Beta blocker helps lower the risk for bleeding.  He does not want to do the banding again due to pain he had from that. He lost weight.  He could also do a diagnostic egd if the improvement of the clot has also led to improvement. That would be another option to discuss with Dr Terry.  July 6 mri: EXAM: MR ABDOMEN W AND WO CONTRAST   CLINICAL INDICATION: follow up SMV/PV thrombosis on previous hepatic imaging before hematology appt in July.   TECHNIQUE: Multisequence, multiplanar MRI of the abdomen was performed without and with intravenous contrast. ESRC.[id number]   COMPARISON: MRI abdomen 11/29/2022, and 3/13/2023   FINDINGS:  Lower Thorax: Normal.   Liver: No fat or iron. Unchanged morphologic changes of chronic liver disease without suspicious lesion.    Gallbladder/Biliary Tree: Normal.   Spleen: Splenomegaly measuring 16.6 cm in CC dimension, previously 16.3 cm in cc dimension 3/13/2023.   Pancreas: Normal.   Adrenal Glands: Normal.    Kidneys/Ureters: Unchanged bilateral simple renal cysts. No hydronephrosis.   Gastrointestinal: Normal.    Lymph Nodes: Normal.   Vessels: Residual peripheral nonocclusive thrombus noted in the distal portal vein and left hepatic vein (14:44 Stable varices.   Peritoneum/Retroperitoneum: Normal.   Bones/Soft Tissues: Degenerative changes of the spine. No aggressive osseous lesions.   IMPRESSION:   1. Nonocclusive residual peripheral thrombus in the main portal vein and left hepatic vein, with significant decrease of clot burden compared to March 2023. 2. Morphologic changes of chronic liver disease with stigmata of portal hypertension, without suspicious hepatic lesion.   The images were reviewed and interpreted by Mariya Farmer MD.  He may want to plan to do the mri in Nov which will be 4m later again and reassess how the residual clot is doing. If the clot is continuing to be better that may be doing better.  March 2023 mri: EXAM: MR ABDOMEN W AND WO CONTRAST   CLINICAL INDICATION: cirrhosis.   TECHNIQUE: Multisequence, multiplanar MRI of the abdomen was performed without and with intravenous contrast. ESRC.2.7.3   COMPARISON: 11/29/2022   FINDINGS:  Lower Thorax: Clear lung base.   Liver: Morphologic changes of chronic liver disease. No suspicious hepatic lesion.   Gallbladder/Biliary Tree: Unremarkable gallbladder. No intra or extrahepatic biliary ductal dilatation.   Spleen: Spleen is enlarged measuring up to 16 cm in the craniocaudal dimension.   Pancreas: No main ductal dilatation.   Adrenal Glands: No adrenal nodules.   Kidneys/Ureters: No hydronephrosis. Bilateral renal cysts.   Gastrointestinal: Nonobstructive bowel loops.    Lymph Nodes: No suspicious lymph nodes by size criteria.   Vessels: Since 11/29/2022, interval significant decrease in known SMV and portal vein thrombosis, with minimal residual filling defects in the portal vein and no filling defects in the SMV. Bilateral portal veins are patent. Patent splenic vein. Upper abdominal varices.   Peritoneum/Retroperitoneum: No free air or free fluid.   Bones/Soft Tissues: Multilevel degenerative changes in the spine. No suspicious osseous lesion. Unremarkable soft tissues.     IMPRESSION: 1.  Hepatic cirrhosis with manifestations of portal hypertension, without suspicious focal hepatic lesion. 2.  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 Patent splenic veins.   Komal pulled the labs and imaging for your visit from [facility] for us: January 23 recent labs show white blood cell count 4.0 which is slightly low hemoglobin elevated at 16.6 hematocrit elevated at 48.2 (sees heme) and platelet count diminished at 73.  Previously your platelet count had been 85 and hemoglobin had been 15.5 in December.  WBC had been 4.6 back in December as well. Neutrophils were 2.27 lymphocytes 1.03 which were normal. Sodium was 140 potassium 5.0 chloride 105 CO2 of 28 BUN 13 creatinine 0.75 glucose was 165.  Albumin was 4.1.  Alkaline phosphatase 95 AST was 36 ALT 32 total bilirubin 0.8.  Previously your AST was 36 ALT of 35 so these appear to be quite stable. Your iron saturation was normal at 32% and ferritin was normal at 78.  NEW MRI:  Your recent March 13 MRI showed lower thorax had clear lung bases.   Liver had morphologic changes of chronic liver disease with no suspicious liver lesions. Unremarkable gallbladder was seen with no intra extrahepatic bile duct dilation. Spleen was enlarged at 16 cm. Pancreas showed no main ductal dilation. Adrenal glands showed no adrenal nodules. Kidney showed no hydronephrosis.  Bilateral renal cysts were seen. Lymph nodes showed no suspicious lymph nodes by size criteria. The vessels showed interval decrease significantly in the known SMV and portal vein thrombosis with minimal residual filling defects seen in the portal vein and no filling defects seen in the SMV.  Bilateral portal veins were patent.  Patent splenic vein was seen.  Upper abdominal varices were seen. Multilevel degenerative changes seen in the spine with no suspicious osseous lesions. Overall, they mention you still had signs of cirrhosis with manifestations of portal hypertension without suspicious lesions and a significant interval resolution of the SMV thrombosis with minimal residual filling defect seen in the main portal vein now and patent splenic veins. I would recommend that we repeat the scan again in approximately 4 months to make sure that it continues to show further improvement and complete resolution if possible but overall very happy to see what it reported and please share with local providers.  He was started on propanolol and eliquis x 3 months.  He sees Hematology 2 x a year.  Narrative & Impression EXAM: MR ABDOMEN W AND WO CONTRAST   CLINICAL INDICATION: cirrhosis.   TECHNIQUE: Multisequence, multiplanar MRI of the abdomen was performed without and with intravenous contrast. ESRC.2.7.3   COMPARISON: 11/29/2022   FINDINGS:  Lower Thorax: Clear lung base.   Liver: Morphologic changes of chronic liver disease. No suspicious hepatic lesion.   Gallbladder/Biliary Tree: Unremarkable gallbladder. No intra or extrahepatic biliary ductal dilatation.   Spleen: Spleen is enlarged measuring up to 16 cm in the craniocaudal dimension.   Pancreas: No main ductal dilatation.   Adrenal Glands: No adrenal nodules.   Kidneys/Ureters: No hydronephrosis. Bilateral renal cysts.   Gastrointestinal: Nonobstructive bowel loops.    Lymph Nodes: No suspicious lymph nodes by size criteria.   Vessels: Since 11/29/2022, interval significant decrease in known SMV and portal vein thrombosis, with minimal residual filling defects in the portal vein and no filling defects in the SMV. Bilateral portal veins are patent. Patent splenic vein. Upper abdominal varices.   Peritoneum/Retroperitoneum: No free air or free fluid.   Bones/Soft Tissues: Multilevel degenerative changes in the spine. No suspicious osseous lesion. Unremarkable soft tissues.     IMPRESSION: 1.  Hepatic cirrhosis with manifestations of portal hypertension, without suspicious focal hepatic lesion. 2.  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 Patent splenic veins.   Epics notes: He saw Dr. Hughes in hematology on January 23, 2023 with labs showing ferritin 78 iron sat 32% sodium 140 potassium 5.0 chloride 105 CO2 of 28 calcium 9.6 BUN of 13 creatinine 0.75 glucose elevated 165 albumin 4.1 alk phos 95 AST 36 ALT 32 total bilirubin 0.8.  Her note mentions that the patient was recently started on anticoagulation due to the superior mesenteric and portal vein thrombosis and seen and platelet count was noted to be 73,000 at that time.  He is on apixaban 5 mg twice daily and his last MRI was in November 2022 and extremities issues.  They felt that  could continue anticoagulation as long as his platelet count remained above 50,000 and no bleeding was seen.  She was planning to see him again on approximately July 24, 2023  He should get the mri redone in early in JU[place] and so that would be available for when sees hematology.  EXAM: MR ABDOMEN W AND WO CONTRAST  CLINICAL INDICATION: Cirrhosis Of Liver.  TECHNIQUE: Multisequence, multiplanar MRI of the abdomen was performed without and with intravenous contrast. ESRC.2.7.3  COMPARISON: 11/30/2021 MRI, 11/12/2022 CT   FINDINGS:  Evaluation is slightly limited by respiratory motion artifact and subsequent misregistration on subtraction imaging.   Lower Thorax: Normal.   Liver: No fat or iron. Morphologic changes of chronic liver disease. Evaluation for arterially enhancing lesions is limited due to early arterial phase of contrast with nonopacification of the portal vein, in part due to known portal venous thrombus. Within these confines: No arterially enhancing lesions. No lesions with washout or capsule.    Gallbladder/Biliary Tree: Normal.   Spleen: Enlarged measuring 16 cm.   Pancreas: Mildly atrophic without ductal dilation. No focal suspicious lesions.   Adrenal Glands: Normal.    Kidneys/Ureters: Renal cysts.   Gastrointestinal: No obstruction.    Lymph Nodes: Normal.   Vessels: Partial opacification of the superior mesenteric vein extending to the main portal vein, seen on prior CT. Near complete opacification of the left portal vein. The right portal vein branches are partially occluded centrally and grossly patent distally. Overall, venous occlusion appears to be secondary to bland thrombus in the main portal and superior mesenteric vein, despite presence of diffuse restricted diffusion. Evaluation for underlying enhancement is limited within the intrahepatic branches due to respiratory motion artifact and misregistration.   Splenic vein is patent. Small upper abdominal, perigastric and paraesophageal varices. Abdominal aorta is normal in course and caliber.   Peritoneum/Retroperitoneum: No significant ascites.   Bones/Soft Tissues: No aggressive osseous lesions.   IMPRESSION: 1. Morphologic changes of chronic liver disease. No definite suspicious liver lesions within the limitations described above. 2. 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 Findings are favored secondary to bland thrombus; however, in this high-risk patient, underlying isolated tumor thrombus is difficult to exclude given presence of restricted diffusion and  extensive respiratory motion artifact and misregistration which limits the use of subtraction imaging. Attention on short-term interval follow-up MRI is recommended. 3. Sequela of portal hypertension, as above.   Critical findings were communicated electronically with and acknowledged by Dr. Zach Menjivar on 11/14/2022 10:49 AM. The following impression point(s) have been communicated: Portal vein and SMV filling defect highly suspicious for thrombus.   The images were reviewed and interpreted by Reid Boykin MD. Addended by Reid Boykin MD on 11/14/2022 12:18 PM  Dr Hughes wanted to put him on blood thinner and we needed to see what Dr Terry said: She mentioned that she would proceed to AC and she wanted on beta blocker as possible. He had been banded x 3 by her recently.  He says he did not speak with her: he reaches her through the call office : [phone]  Last egd with Dr Terry was Nov 2022 and he needs to follow up with her re this. He is not ready to redo this as says it was painful.   EXAM: CT ABDOMEN PELVIS W AND WO IV CONTRAST   CLINICAL INDICATION: R10.30: Lower abdominal pain, unspecified R10.13: Epigastric pain.   TECHNIQUE: Noncontrast images through the abdomen and pelvis were obtained. Following administration of non-ionic IV contrast, postcontrast images through the abdomen and pelvis were obtained. ESRC.1.7.3 If applicable, point-of-care testing?was approved following departmental protocol.   COMPARISON: MRI abdomen 11/30/2021.   FINDINGS:  Lower Thorax: Left upper lobe 4.5 x 3.8 mm hyperdensity (series 4, image 2), consistent with calcified granuloma.   Liver: Heterogeneous enhancement with underlying changes of chronic liver disease including mildly nodular contour and fissural widening. No suspicious focal lesions.   Gallbladder/Biliary Tree: Normal.   Spleen: Splenomegaly, with spleen measuring 16.5 cm.   Pancreas: Normal.   Adrenal Glands: Normal.   Kidneys/Ureters:  Symmetrically enhancing. No hydronephrosis. Multiple bilateral subcentimeter hypoattenuating lesions too small to further characterize.   Gastrointestinal: The appendix is not definitively seen. No bowel obstruction. Asymmetric right pericolic edema compared to the left, which is likely related to chronic liver disease/portal colopathy, however colitis is not excluded.   Bladder: Normal.   Prostate/Seminal Vesicles: Normal.   Lymph Nodes: No pathological lymphadenopathy.   Vessels:  Filling defect within the portal vein and distal superior mesenteric vein, possibly involving branches of the SMV and extending into the left portal vein. Small esophageal and perigastric varices. Minimal calcifications of the abdominal aorta, bilateral renal and internal iliac arteries.   Peritoneum/Retroperitoneum: Normal.   Bones/Soft Tissues: Tiny fat-containing umbilical hernia.   IMPRESSION: 1.  Portal vein and superior mesenteric vein filling defect, which is highly suspicious for thrombus. 2.  Asymmetric right pericolic edema compared to the left, which is likely related to chronic liver disease/portal colopathy. Colitis less likely. 3.  Morphologic changes of chronic liver disease. No suspicious focal hepatic lesions.   4.  Sequelae of portal hypertension, including splenomegaly and small upper abdominal varices.    The images were reviewed and interpreted by Reid Boykin MD.  Nov 1 db 0.21, IB 0.69. Wbc 5.1 hg 16.4 platelets 80 and mcv 92.7. Na 140 and k 4.4 an cl 103 and co2 29 and ca 9.4 and bun 17 and cr 0.7 and glu 151 and tp 7.7 and alb 4.1 and ast 36 and alt 35 and tb 0.9 and HIV 93. RPR neg. CD4 147 down from 192.  He was found to have the clot as few months ago said may have overstressed shoulder and had fall earlier and this was found some nonspecific pain in aug.  [facility] 6 [facility] labs sent in: na 140 and k 4.8 and cl 101 and co2 29 and glu 203 elevated and please discuss with local provider.  TB 0.7 and alb 4.2 and ca 9.2. cr 0.72. Ast 37 and alt 38 (ideal alt less than 35). Alk 86.   Wbc 3.6 little low and hct 46.3 and plat 72 low. Prior other labs: cmp showed glucose 117 also up. Ast 44 and alt 41 so little lower now. Cbc not run and inr 1.1. Meld using the labs was 7 and meld na 7 so remains low.  Pt says he has high sugar and being watched.  June 2022 labs show that the CBC was clumped and so not able to be run.  June 6 ultrasound shows liver to be mildly coarsened with regards to the echotexture but with no lesions. Bile duct showed no dilated bile ducts and common duct was 3 mm. Gallbladder was normal. Pancreas was obscured by overlying structures. Right kidney 12.3 cm and left kidney 13.6 cm.  No hydronephrosis. Spleen enlarged at 16.7 cm. Liver vessels patent. Prior scan was an MRI from November 30 of last year that showed that the liver had mild morphologic changes of chronic liver disease with fissural widening and minimal nodular contour and no suspicious lesions.  Spleen was enlarged and also at 16 cm.  Small esophageal and perigastric varices were seen.   March 28, 2022 labs show IgM elevated slightly up to 247 with normal from 20 up to 172.  INR 1.1.  White blood cell count 4.4 hemoglobin 15.7 plate count 79 MCV 94 neutrophils 2.8 lymphocytes 0.9 glucose 91 BUN is 16 creatinine 0.73 sodium 138 potassium 4.5 albumin 4.3 bilirubin 0.7 alkaline phosphatase 83 AST 46 and ALT 48. Meld 7  and meld na 7.  He did the covid 19 booster last year he thinks 10-7-21 and has not done the 2nd booster.  January 17, 2022 labs show white blood cell count 4.9 hemoglobin 16.5 hematocrit 45.5 platelet count low at 72.  MCV normal at 92.  Neutrophils 2.9 lymphocytes 1.2.  Pt normally bp 127/90 range. He says not on meds for bp at this time. Maybe see primary to start a low dose beta blocker like carvedilol. That was recommended by Dr Terry as blood thinner and the varices.  Nov 17 2022 egd 3 columns grade 2 ev and no bleeding and incompletely eradicated with banding. SHe recomended egd in 2m.  EGD done by  on April 11, 2022 at [facility] shows 2 columns of grade 1 varices and one Grade 2 varices in the lower third of the esophagus.  Single 7 mm sessile polyp with no bleeding and no stigmata of recent bleeding seen at the cardia. It was removed with hot snare.  It was retrieved.  Two  6 to 10 mm sessile polyps with no bleeding seen on the lesser curvature of the stomach and removed with hot snare.  Mild inflammation characterized by the erythema and granularity seen of the gastric antrum duodenum normal.  Pathology of this came back as gastric polyps x3 with cauterized gastric mucosa with regenerative epithelial changes and cystic dilation with suggestive hyperplastic polyps.    January 25, 2022 EGD showed 2 columns grade 1 varices in 1: Grade 2 varices and diffuse moderate inflammation characterized by congestion erythema granularity of the gastric antrum.  Multiple 2 to 10 mm polyp seen with no bleeding.  January 25, 2022 colonoscopy showed entire colon was normal with nonbleeding rectal varices.  A single subcentimeter condyloma was seen.  Internal hemorrhoids not bleeding was seen at the endoscopy.  There were small in nature.  Patient was to be referred to colorectal surgeon.  He did see april 19 the rectal surgeon and they did a treatment for a condyloma.  November 30, 2021 MRI showed at [facility] lower thorax normal.  Liver with no fat or iron but with mild morphologic changes chronic liver disease with fissural widening and minimal nodular contour and no suspicious lesions.  Splenomegaly to 16 cm seen.  Gallbladder/biliary tree normal.  Pancreas normal.  Kidneys normal.  Liver vessels patent.  Small esophageal and perigastric varices seen.    PT Jan 2022 seen by Ms Jessenia LAURENT for TH.  he is on mvi gummies which has 2000% biotin in it. would recommend he avoid. Also on mobic for a year or so, recommend he avoid this as well.   labs 1/3/22: na 138 alt 41 ast 42 alp 77tb 0.7 wbc 4.5 hgb 16.2 plt low 75      RECAP: He has a hx of HIV diagnosed in 1986- treatment initiated in 1996 and follows with Jeffery Hawk PA-C. and Dr Matthew.  Currently on Biktarvy since 01/2020.  Prezcobix was recently discontinued about 2 months ago as concern this was contributing to thrombocytopenia.   He has been seeing hematology (Dr. Yvette Hughes) for Thrombocytopenia: ranges from 139K in 1/28/2013 to 114K in 1/2018 when stabilized and dropped to the 90's in February 2020. Concern of drug related thrombocytopenia with Biktarvy and Prezcobix. CD4 is below 200 and so on Atovaquone (Mepron) for PCP prophylaxis.    MRI 12/2/2021 Liver: No fat or iron. Mild morphologic changes of chronic liver disease with fissural widening and minimal nodular contour. No suspicious lesions.   Patent portal vein, with sequela of portal hypertension, including splenomegaly and small upper abdominal varices.  No personal hx of fatty liver, hepatitis, or other liver disease.  No family hx of liver disease.  Records indicate hep c and hep b negative 2014.  Hep B core+ as of 11/2021 but otherwise hepatitis work up negative.  LFTs in [facility] reviewed from 11/2021 and WNL (alt 39, alp 76, ast 37) as are his Fe+ studies. Plt 85 11/15/21.   INR 1.08 10/2021.    Hx of hld currently on pravastatin 2017.   No hx of DM and last a1c 5.4%.  Weight has been fairly stable at about 170-175 over past 6 years, was up to max of 210 in late 1990s.  Summer 2022 175 and Winter 2022 to 170.  No alcohol use.    Marijuana use daily.  Some reports of this causing inc lfts and inc fibrosis. Still doing that.  Denies other drug or tobacco use.  Denies use of otc herbals/supplements.  He has a hx of osteoporosis with last DEXA November 2021 improved, s/p 3 Reclast infusions, follows with Dr. Namita Long endocrinology. He says not seen then in a while and he will check in.  Seem by Viry Serrano PA-C, 12/8/21 for fecal occult +. 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 Denies melena, hematochezia, N/V. Notes abdominal gurgling for years with ARVs without triggers. No anorexia or weight loss. Denies GERD and dysphagia.  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 No prior EGD.    Plan: 1. Happy to see that the anticoagulation is working and nonocclusive clot and smaller and need to see if on the next 4m mri how it looks. 2. If it resolves, then phtn will be better and the real quesiton is will the varices be gone. 3. Pt says he is having possible issues of the beta blocker and I encouraged him to meet in person with dr Terry to discuss options: relook egd to see if the varices are still there and if so as to size. If they are gone maybe could be off and if not could limited banding be planned to shrink them and avoid the need for the beta blocker. 4. He will do the mri and labs with Jeffery Hawk in the ID clinic as has the better coverage there for the tests. 5. Plan for visit post the mri in Nov with us.  Duration of the visit was  minutes with 10 minutes of chart prep and 26 minutes by healow video for this TeleMed visit with time reviewing their prior and recent records and labs and discussing their current status and future plans for care for the patient.

## 2023-11-30 ENCOUNTER — CLAIMS CREATED FROM THE CLAIM WINDOW (OUTPATIENT)
Dept: URBAN - METROPOLITAN AREA TELEHEALTH 2 | Facility: TELEHEALTH | Age: 63
End: 2023-11-30
Payer: COMMERCIAL

## 2023-11-30 VITALS — HEIGHT: 69 IN | BODY MASS INDEX: 26.51 KG/M2 | WEIGHT: 179 LBS

## 2023-11-30 DIAGNOSIS — B20 HIV INFECTION, UNSPECIFIED SYMPTOM STATUS: ICD-10-CM

## 2023-11-30 DIAGNOSIS — K76.6 PORTAL HYPERTENSION: ICD-10-CM

## 2023-11-30 DIAGNOSIS — R93.5 ABNORMAL MRI OF ABDOMEN: ICD-10-CM

## 2023-11-30 DIAGNOSIS — K74.60 CIRRHOSIS OF LIVER WITHOUT ASCITES, UNSPECIFIED HEPATIC CIRRHOSIS TYPE: ICD-10-CM

## 2023-11-30 DIAGNOSIS — Z79.899 HIGH RISK MEDICATION USE: ICD-10-CM

## 2023-11-30 DIAGNOSIS — I81 PORTAL VEIN THROMBOSIS: ICD-10-CM

## 2023-11-30 DIAGNOSIS — R76.8 HEPATITIS B CORE ANTIBODY POSITIVE: ICD-10-CM

## 2023-11-30 DIAGNOSIS — M81.0 OSTEOPOROSIS WITHOUT CURRENT PATHOLOGICAL FRACTURE, UNSPECIFIED OSTEOPOROSIS TYPE: ICD-10-CM

## 2023-11-30 DIAGNOSIS — K55.069 SUPERIOR MESENTERIC VEIN THROMBOSIS: ICD-10-CM

## 2023-11-30 DIAGNOSIS — E78.2 MIXED HYPERLIPIDEMIA: ICD-10-CM

## 2023-11-30 DIAGNOSIS — I85.00 ESOPHAGEAL VARICES WITHOUT BLEEDING, UNSPECIFIED ESOPHAGEAL VARICES TYPE: ICD-10-CM

## 2023-11-30 DIAGNOSIS — K74.69 CIRRHOSIS, CRYPTOGENIC: ICD-10-CM

## 2023-11-30 DIAGNOSIS — D69.6 THROMBOCYTOPENIA: ICD-10-CM

## 2023-11-30 DIAGNOSIS — F32.A DEPRESSION, UNSPECIFIED: ICD-10-CM

## 2023-11-30 DIAGNOSIS — R73.09 ELEVATED GLUCOSE: ICD-10-CM

## 2023-11-30 DIAGNOSIS — R19.5 OCCULT BLOOD POSITIVE STOOL: ICD-10-CM

## 2023-11-30 DIAGNOSIS — F41.9 ANXIETY DISORDER, UNSPECIFIED: ICD-10-CM

## 2023-11-30 DIAGNOSIS — R74.8 ELEVATED LIVER ENZYMES: ICD-10-CM

## 2023-11-30 PROCEDURE — 99214 OFFICE O/P EST MOD 30 MIN: CPT

## 2023-11-30 RX ORDER — DRONABINOL 2.5 MG/1
1 CAPSULE IN THE EVENING OR AT BEDTIME CAPSULE ORAL
COMMUNITY

## 2023-11-30 RX ORDER — BICTEGRAVIR SODIUM, EMTRICITABINE, AND TENOFOVIR ALAFENAMIDE FUMARATE 50; 200; 25 MG/1; MG/1; MG/1
1 TABLET TABLET ORAL ONCE A DAY
COMMUNITY

## 2023-11-30 RX ORDER — VITAMIN A 2400 MCG
AS DIRECTED CAPSULE ORAL ONCE A DAY
COMMUNITY

## 2023-11-30 RX ORDER — PRAVASTATIN SODIUM 40 MG/1
1 TABLET TABLET ORAL ONCE A DAY
COMMUNITY

## 2023-11-30 RX ORDER — PROPRANOLOL HYDROCHLORIDE 10 MG/1
1 TABLET TABLET ORAL TWICE A DAY
COMMUNITY

## 2023-11-30 RX ORDER — PREGABALIN 75 MG/1
1 CAPSULE CAPSULE ORAL TWICE A DAY
COMMUNITY

## 2023-11-30 RX ORDER — ACYCLOVIR 200 MG/1
1 CAPSULE CAPSULE ORAL TWICE A DAY
COMMUNITY

## 2023-11-30 RX ORDER — APIXABAN 2.5 MG/1
1 TABLET, FILM COATED ORAL TWICE A DAY
COMMUNITY

## 2023-11-30 RX ORDER — OMEPRAZOLE 40 MG/1
1 CAPSULE 30 MINUTES BEFORE MORNING MEAL CAPSULE, DELAYED RELEASE ORAL ONCE A DAY
COMMUNITY

## 2023-11-30 RX ORDER — IMIQUIMOD 50 MG/G
1 APPLICATION AT BEDTIME, LEAVE ON FOR 8 HOURS THEN WASH OFF CREAM TOPICAL
COMMUNITY

## 2023-11-30 RX ORDER — LOPERAMIDE HYDROCHLORIDE 2 MG/1
1 TABLET AS NEEDED CAPSULE ORAL
COMMUNITY

## 2023-11-30 RX ORDER — LORATADINE 10 MG/1
1 TABLET TABLET ORAL ONCE A DAY
COMMUNITY

## 2023-11-30 NOTE — HPI-TODAY'S VISIT:
This is a 63 year old male referred  by Viry Serrano PA-C and last seen Aug 2023 for an evaluation of abnormal MRI with suspected cirrhosis/portal htn and new clot seen on scan.  A copy of this note will be sent to the referring provider.  November 9 MRI ordered by Brady Hawk sent in.Lower thorax normal. Liver with no fat or iron but with morphologic changes of chronic liver disease seen including nodular surface contour, lobar redistribution and fissural widening.  No new suspicious lesions. Gallbladder/biliary tree normal. Spleen mildly enlarged measuring up to 15.8 cm. Pancreas normal. Adrenal glands normal. Kidneys showing subcentimeter bilateral renal cysts but no hydronephrosis. Gastrointestinal views normal. Lymph nodes normal. They mention extensive motion artifact on postcontrast imaging and this limited views but they suggested probable residual nonocclusive thrombus in the left central portal vein similar to July 6, 2023 and additional possible minimal nonocclusive filling defects within the main portal vein cannot be excluded on this imaging.  Patent SMV.  Similar upper abdominal perigastric and perisplenic kaylynn hepatis varices. He does not recall any issues. Trace free fluid seen in the abdomen. No aggressive osseous lesions. In summary, evaluation residual portal vein thrombosis was significantly suboptimal secondary to extensive motion artifact on the postcontrast imaging.  There is a suggestion of probable residual nonocclusive thrombus within the left portal vein as seen in July 6 and additional minimally occlusive filling defects in the main portal vein cannot be excluded recommend continued attention on short-term follow-up. Morphologic changes of chronic liver disease with sequela of portal hypertension and mild splenomegaly and trace ascites in upper abdominal varices seen.  November 2, 2023.  Debated on the date that the patient saw Dr. Terry to be scheduled for an EGD with variceal banding but patient needed to see cardiology prior to the EGD and to hold the Eliquis for 3 days before. Jan 18 2024 appt dr Zackary Solano. He is to see him as she thought of irregular heart beat.  He is pending some labs to be done for Dr Menjivar and to do this. Dr Matthew in Jan 2024.   July 24 labs show ferritin slightly higher at 100 from January when it was 78 and in June of last year was 42.  Iron saturation was 34% and back in May had been 42% and in January it had been 32%. Sodium 138 potassium 4.4 chloride 104 CO2 25 calcium 8.8 BUN of 14 creatinine 0.7 glucose was up at 183 albumin 3.8 total protein 7.8 alkaline phosphatase slightly up at 112 and had been previously normal at 88.  AST of 32 and ALT of 39 and previously AST 42 and ALT 46.  WBC 4.6 hemoglobin 15.6 platelet count 75 MCV 90.5. Asked if any recent issues to explain alk phos and he denied any issue of new meds. May 2023 HIV level was 40 and CD4 count was 161 and was previously 147.  Dr Hughes is following 2 x a year.  July 25 saw Dr Hughes: summary she cut back eliquis dose to half. Iron deficiency anemia     Normal ferritin, off iron supplements        Thrombocytopenia  : Stable platelet count, asymptomatic  Patient can continue on anticoagulation as long as platelet count is above 50,000 and no bleeding Next follow up appointment in 6 months.  Portal and mesenteric vein thrombosis:  Elevated Ddimer as per today labs Despite Ddimer is elevated, I suggested decreasing apixaban to 2.5 mg BID. Although patient believes that fatigue is related to Apixaban, I suspect that is related to propranolol. He will discuss with Dr. Dominguez (hepatologist)  He says he told her that he does not like blood thinners.  Re the beta blocker, option if this is not to be used is to do banding instead and he has concerns with doing the banding and that limits options. Beta blocker helps lower the risk for bleeding.  He does not want to do the banding again due to pain he had from that. He lost weight.  He could also do a diagnostic egd if the improvement of the clot has also led to improvement. That would be another option to discuss with Dr Terry.  July 6 mri: EXAM: MR ABDOMEN W AND WO CONTRAST   CLINICAL INDICATION: follow up SMV/PV thrombosis on previous hepatic imaging before hematology appt in July.   TECHNIQUE: Multisequence, multiplanar MRI of the abdomen was performed without and with intravenous contrast. ESRC.[id number]   COMPARISON: MRI abdomen 11/29/2022, and 3/13/2023   FINDINGS:  Lower Thorax: Normal.   Liver: No fat or iron. Unchanged morphologic changes of chronic liver disease without suspicious lesion.    Gallbladder/Biliary Tree: Normal.   Spleen: Splenomegaly measuring 16.6 cm in CC dimension, previously 16.3 cm in cc dimension 3/13/2023.   Pancreas: Normal.   Adrenal Glands: Normal.    Kidneys/Ureters: Unchanged bilateral simple renal cysts. No hydronephrosis.   Gastrointestinal: Normal.    Lymph Nodes: Normal.   Vessels: Residual peripheral nonocclusive thrombus noted in the distal portal vein and left hepatic vein (14:44 Stable varices.   Peritoneum/Retroperitoneum: Normal.   Bones/Soft Tissues: Degenerative changes of the spine. No aggressive osseous lesions.   IMPRESSION:   1. Nonocclusive residual peripheral thrombus in the main portal vein and left hepatic vein, with significant decrease of clot burden compared to March 2023. 2. Morphologic changes of chronic liver disease with stigmata of portal hypertension, without suspicious hepatic lesion.   The images were reviewed and interpreted by Mariya Farmer MD.  He may want to plan to do the mri in Nov which will be 4m later again and reassess how the residual clot is doing. If the clot is continuing to be better that may be doing better.  March 2023 mri: EXAM: MR ABDOMEN W AND WO CONTRAST   CLINICAL INDICATION: cirrhosis.   TECHNIQUE: Multisequence, multiplanar MRI of the abdomen was performed without and with intravenous contrast. ESRC.2.7.3   COMPARISON: 11/29/2022   FINDINGS:  Lower Thorax: Clear lung base.   Liver: Morphologic changes of chronic liver disease. No suspicious hepatic lesion.   Gallbladder/Biliary Tree: Unremarkable gallbladder. No intra or extrahepatic biliary ductal dilatation.   Spleen: Spleen is enlarged measuring up to 16 cm in the craniocaudal dimension.   Pancreas: No main ductal dilatation.   Adrenal Glands: No adrenal nodules.   Kidneys/Ureters: No hydronephrosis. Bilateral renal cysts.   Gastrointestinal: Nonobstructive bowel loops.    Lymph Nodes: No suspicious lymph nodes by size criteria.   Vessels: Since 11/29/2022, interval significant decrease in known SMV and portal vein thrombosis, with minimal residual filling defects in the portal vein and no filling defects in the SMV. Bilateral portal veins are patent. Patent splenic vein. Upper abdominal varices.   Peritoneum/Retroperitoneum: No free air or free fluid.   Bones/Soft Tissues: Multilevel degenerative changes in the spine. No suspicious osseous lesion. Unremarkable soft tissues.     IMPRESSION: 1.  Hepatic cirrhosis with manifestations of portal hypertension, without suspicious focal hepatic lesion. 2.  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 Patent splenic veins.   Komal pulled the labs and imaging for your visit from [place] for us: January 23 recent labs show white blood cell count 4.0 which is slightly low hemoglobin elevated at 16.6 hematocrit elevated at 48.2 (sees heme) and platelet count diminished at 73.  Previously your platelet count had been 85 and hemoglobin had been 15.5 in December.  WBC had been 4.6 back in December as well. Neutrophils were 2.27 lymphocytes 1.03 which were normal. Sodium was 140 potassium 5.0 chloride 105 CO2 of 28 BUN 13 creatinine 0.75 glucose was 165.  Albumin was 4.1.  Alkaline phosphatase 95 AST was 36 ALT 32 total bilirubin 0.8.  Previously your AST was 36 ALT of 35 so these appear to be quite stable. Your iron saturation was normal at 32% and ferritin was normal at 78.  NEW MRI:  Your recent March 13 MRI showed lower thorax had clear lung bases.   Liver had morphologic changes of chronic liver disease with no suspicious liver lesions. Unremarkable gallbladder was seen with no intra extrahepatic bile duct dilation. Spleen was enlarged at 16 cm. Pancreas showed no main ductal dilation. Adrenal glands showed no adrenal nodules. Kidney showed no hydronephrosis.  Bilateral renal cysts were seen. Lymph nodes showed no suspicious lymph nodes by size criteria. The vessels showed interval decrease significantly in the known SMV and portal vein thrombosis with minimal residual filling defects seen in the portal vein and no filling defects seen in the SMV.  Bilateral portal veins were patent.  Patent splenic vein was seen.  Upper abdominal varices were seen. Multilevel degenerative changes seen in the spine with no suspicious osseous lesions. Overall, they mention you still had signs of cirrhosis with manifestations of portal hypertension without suspicious lesions and a significant interval resolution of the SMV thrombosis with minimal residual filling defect seen in the main portal vein now and patent splenic veins. I would recommend that we repeat the scan again in approximately 4 months to make sure that it continues to show further improvement and complete resolution if possible but overall very happy to see what it reported and please share with local providers.  He was started on propanolol and eliquis x 3 months.  He sees Hematology 2 x a year.  Narrative & Impression EXAM: MR ABDOMEN W AND WO CONTRAST   CLINICAL INDICATION: cirrhosis.   TECHNIQUE: Multisequence, multiplanar MRI of the abdomen was performed without and with intravenous contrast. ESRC.2.7.3   COMPARISON: 11/29/2022   FINDINGS:  Lower Thorax: Clear lung base.   Liver: Morphologic changes of chronic liver disease. No suspicious hepatic lesion.   Gallbladder/Biliary Tree: Unremarkable gallbladder. No intra or extrahepatic biliary ductal dilatation.   Spleen: Spleen is enlarged measuring up to 16 cm in the craniocaudal dimension.   Pancreas: No main ductal dilatation.   Adrenal Glands: No adrenal nodules.   Kidneys/Ureters: No hydronephrosis. Bilateral renal cysts.   Gastrointestinal: Nonobstructive bowel loops.    Lymph Nodes: No suspicious lymph nodes by size criteria.   Vessels: Since 11/29/2022, interval significant decrease in known SMV and portal vein thrombosis, with minimal residual filling defects in the portal vein and no filling defects in the SMV. Bilateral portal veins are patent. Patent splenic vein. Upper abdominal varices.   Peritoneum/Retroperitoneum: No free air or free fluid.   Bones/Soft Tissues: Multilevel degenerative changes in the spine. No suspicious osseous lesion. Unremarkable soft tissues.     IMPRESSION: 1.  Hepatic cirrhosis with manifestations of portal hypertension, without suspicious focal hepatic lesion. 2.  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 Patent splenic veins.   Epics notes: He saw Dr. Hughes in hematology on January 23, 2023 with labs showing ferritin 78 iron sat 32% sodium 140 potassium 5.0 chloride 105 CO2 of 28 calcium 9.6 BUN of 13 creatinine 0.75 glucose elevated 165 albumin 4.1 alk phos 95 AST 36 ALT 32 total bilirubin 0.8.  Her note mentions that the patient was recently started on anticoagulation due to the superior mesenteric and portal vein thrombosis and seen and platelet count was noted to be 73,000 at that time.  He is on apixaban 5 mg twice daily and his last MRI was in November 2022 and extremities issues.  They felt that  could continue anticoagulation as long as his platelet count remained above 50,000 and no bleeding was seen.  She was planning to see him again on approximately July 24, 2023  He should get the mri redone in early in [place] and so that would be available for when sees hematology.  EXAM: MR ABDOMEN W AND WO CONTRAST  CLINICAL INDICATION: Cirrhosis Of Liver.  TECHNIQUE: Multisequence, multiplanar MRI of the abdomen was performed without and with intravenous contrast. ESRC.2.7.3  COMPARISON: 11/30/2021 MRI, 11/12/2022 CT   FINDINGS:  Evaluation is slightly limited by respiratory motion artifact and subsequent misregistration on subtraction imaging.   Lower Thorax: Normal.   Liver: No fat or iron. Morphologic changes of chronic liver disease. Evaluation for arterially enhancing lesions is limited due to early arterial phase of contrast with nonopacification of the portal vein, in part due to known portal venous thrombus. Within these confines: No arterially enhancing lesions. No lesions with washout or capsule.    Gallbladder/Biliary Tree: Normal.   Spleen: Enlarged measuring 16 cm.   Pancreas: Mildly atrophic without ductal dilation. No focal suspicious lesions.   Adrenal Glands: Normal.    Kidneys/Ureters: Renal cysts.   Gastrointestinal: No obstruction.    Lymph Nodes: Normal.   Vessels: Partial opacification of the superior mesenteric vein extending to the main portal vein, seen on prior CT. Near complete opacification of the left portal vein. The right portal vein branches are partially occluded centrally and grossly patent distally. Overall, venous occlusion appears to be secondary to bland thrombus in the main portal and superior mesenteric vein, despite presence of diffuse restricted diffusion. Evaluation for underlying enhancement is limited within the intrahepatic branches due to respiratory motion artifact and misregistration.   Splenic vein is patent. Small upper abdominal, perigastric and paraesophageal varices. Abdominal aorta is normal in course and caliber.   Peritoneum/Retroperitoneum: No significant ascites.   Bones/Soft Tissues: No aggressive osseous lesions.   IMPRESSION: 1. Morphologic changes of chronic liver disease. No definite suspicious liver lesions within the limitations described above. 2. 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 Findings are favored secondary to bland thrombus; however, in this high-risk patient, underlying isolated tumor thrombus is difficult to exclude given presence of restricted diffusion and  extensive respiratory motion artifact and misregistration which limits the use of subtraction imaging. Attention on short-term interval follow-up MRI is recommended. 3. Sequela of portal hypertension, as above.   Critical findings were communicated electronically with and acknowledged by Dr. Zach Menjivar on 11/14/2022 10:49 AM. The following impression point(s) have been communicated: Portal vein and SMV filling defect highly suspicious for thrombus.   The images were reviewed and interpreted by Reid Boykin MD. Addended by Reid Boykin MD on 11/14/2022 12:18 PM  Dr Hughes wanted to put him on blood thinner and we needed to see what Dr Terry said: She mentioned that she would proceed to AC and she wanted on beta blocker as possible. He had been banded x 3 by her recently.  He says he did not speak with her: he reaches her through the call office : [phone]  Last egd with Dr Terry was Nov 2022 and he needs to follow up with her re this. He is not ready to redo this as says it was painful.   EXAM: CT ABDOMEN PELVIS W AND WO IV CONTRAST   CLINICAL INDICATION: R10.30: Lower abdominal pain, unspecified R10.13: Epigastric pain.   TECHNIQUE: Noncontrast images through the abdomen and pelvis were obtained. Following administration of non-ionic IV contrast, postcontrast images through the abdomen and pelvis were obtained. ESRC.1.7.3 If applicable, point-of-care testing?was approved following departmental protocol.   COMPARISON: MRI abdomen 11/30/2021.   FINDINGS:  Lower Thorax: Left upper lobe 4.5 x 3.8 mm hyperdensity (series 4, image 2), consistent with calcified granuloma.   Liver: Heterogeneous enhancement with underlying changes of chronic liver disease including mildly nodular contour and fissural widening. No suspicious focal lesions.   Gallbladder/Biliary Tree: Normal.   Spleen: Splenomegaly, with spleen measuring 16.5 cm.   Pancreas: Normal.   Adrenal Glands: Normal.   Kidneys/Ureters:  Symmetrically enhancing. No hydronephrosis. Multiple bilateral subcentimeter hypoattenuating lesions too small to further characterize.   Gastrointestinal: The appendix is not definitively seen. No bowel obstruction. Asymmetric right pericolic edema compared to the left, which is likely related to chronic liver disease/portal colopathy, however colitis is not excluded.   Bladder: Normal.   Prostate/Seminal Vesicles: Normal.   Lymph Nodes: No pathological lymphadenopathy.   Vessels:  Filling defect within the portal vein and distal superior mesenteric vein, possibly involving branches of the SMV and extending into the left portal vein. Small esophageal and perigastric varices. Minimal calcifications of the abdominal aorta, bilateral renal and internal iliac arteries.   Peritoneum/Retroperitoneum: Normal.   Bones/Soft Tissues: Tiny fat-containing umbilical hernia.   IMPRESSION: 1.  Portal vein and superior mesenteric vein filling defect, which is highly suspicious for thrombus. 2.  Asymmetric right pericolic edema compared to the left, which is likely related to chronic liver disease/portal colopathy. Colitis less likely. 3.  Morphologic changes of chronic liver disease. No suspicious focal hepatic lesions.   4.  Sequelae of portal hypertension, including splenomegaly and small upper abdominal varices.    The images were reviewed and interpreted by Reid Boykin MD.  Nov 1 db 0.21, IB 0.69. Wbc 5.1 hg 16.4 platelets 80 and mcv 92.7. Na 140 and k 4.4 an cl 103 and co2 29 and ca 9.4 and bun 17 and cr 0.7 and glu 151 and tp 7.7 and alb 4.1 and ast 36 and alt 35 and tb 0.9 and HIV 93. RPR neg. CD4 147 down from 192.  He was found to have the clot as few months ago said may have overstressed shoulder and had fall earlier and this was found some nonspecific pain in aug.  June 6 [place] labs sent in: na 140 and k 4.8 and cl 101 and co2 29 and glu 203 elevated and please discuss with local provider.  TB 0.7 and alb 4.2 and ca 9.2. cr 0.72. Ast 37 and alt 38 (ideal alt less than 35). Alk 86.   Wbc 3.6 little low and hct 46.3 and plat 72 low. Prior other labs: cmp showed glucose 117 also up. Ast 44 and alt 41 so little lower now. Cbc not run and inr 1.1. Meld using the labs was 7 and meld na 7 so remains low.  Pt says he has high sugar and being watched.  June 2022 labs show that the CBC was clumped and so not able to be run.  June 6 ultrasound shows liver to be mildly coarsened with regards to the echotexture but with no lesions. Bile duct showed no dilated bile ducts and common duct was 3 mm. Gallbladder was normal. Pancreas was obscured by overlying structures. Right kidney 12.3 cm and left kidney 13.6 cm.  No hydronephrosis. Spleen enlarged at 16.7 cm. Liver vessels patent. Prior scan was an MRI from November 30 of last year that showed that the liver had mild morphologic changes of chronic liver disease with fissural widening and minimal nodular contour and no suspicious lesions.  Spleen was enlarged and also at 16 cm.  Small esophageal and perigastric varices were seen.   March 28, 2022 labs show IgM elevated slightly up to 247 with normal from 20 up to 172.  INR 1.1.  White blood cell count 4.4 hemoglobin 15.7 plate count 79 MCV 94 neutrophils 2.8 lymphocytes 0.9 glucose 91 BUN is 16 creatinine 0.73 sodium 138 potassium 4.5 albumin 4.3 bilirubin 0.7 alkaline phosphatase 83 AST 46 and ALT 48. Meld 7  and meld na 7.  He did the covid 19 booster last year he thinks 10-7-21 and has not done the 2nd booster.  January 17, 2022 labs show white blood cell count 4.9 hemoglobin 16.5 hematocrit 45.5 platelet count low at 72.  MCV normal at 92.  Neutrophils 2.9 lymphocytes 1.2.  Pt normally bp 127/90 range. He says not on meds for bp at this time. Maybe see primary to start a low dose beta blocker like carvedilol. That was recommended by Dr Terry as blood thinner and the varices.  Nov 17 2022 egd 3 columns grade 2 ev and no bleeding and incompletely eradicated with banding. SHe recomended egd in 2m.  EGD done by  on April 11, 2022 at [facility] shows 2 columns of grade 1 varices and one Grade 2 varices in the lower third of the esophagus.  Single 7 mm sessile polyp with no bleeding and no stigmata of recent bleeding seen at the cardia. It was removed with hot snare.  It was retrieved.  Two  6 to 10 mm sessile polyps with no bleeding seen on the lesser curvature of the stomach and removed with hot snare.  Mild inflammation characterized by the erythema and granularity seen of the gastric antrum duodenum normal.  Pathology of this came back as gastric polyps x3 with cauterized gastric mucosa with regenerative epithelial changes and cystic dilation with suggestive hyperplastic polyps.    January 25, 2022 EGD showed 2 columns grade 1 varices in 1: Grade 2 varices and diffuse moderate inflammation characterized by congestion erythema granularity of the gastric antrum.  Multiple 2 to 10 mm polyp seen with no bleeding.  January 25, 2022 colonoscopy showed entire colon was normal with nonbleeding rectal varices.  A single subcentimeter condyloma was seen.  Internal hemorrhoids not bleeding was seen at the endoscopy.  There were small in nature.  Patient was to be referred to colorectal surgeon.  He did see april 19 the rectal surgeon and they did a treatment for a condyloma.  November 30, 2021 MRI showed at [place] lower thorax normal.  Liver with no fat or iron but with mild morphologic changes chronic liver disease with fissural widening and minimal nodular contour and no suspicious lesions.  Splenomegaly to 16 cm seen.  Gallbladder/biliary tree normal.  Pancreas normal.  Kidneys normal.  Liver vessels patent.  Small esophageal and perigastric varices seen.    PT Jan 2022 seen by Ms Jessenia LAURENT for TH.  he is on mvi gummies which has 2000% biotin in it. would recommend he avoid. Also on mobic for a year or so, recommend he avoid this as well.   labs 1/3/22: na 138 alt 41 ast 42 alp 77tb 0.7 wbc 4.5 hgb 16.2 plt low 75      RECAP: He has a hx of HIV diagnosed in 1986- treatment initiated in 1996 and follows with Jeffery Hawk PA-C. and Dr Matthew.  Currently on Biktarvy since 01/2020.  Prezcobix was recently discontinued about 2 months ago as concern this was contributing to thrombocytopenia.   He has been seeing hematology (Dr. Yvette Hughes) for Thrombocytopenia: ranges from 139K in 1/28/2013 to 114K in 1/2018 when stabilized and dropped to the 90's in February 2020. Concern of drug related thrombocytopenia with Biktarvy and Prezcobix. CD4 is below 200 and so on Atovaquone (Mepron) for PCP prophylaxis.    MRI 12/2/2021 Liver: No fat or iron. Mild morphologic changes of chronic liver disease with fissural widening and minimal nodular contour. No suspicious lesions.   Patent portal vein, with sequela of portal hypertension, including splenomegaly and small upper abdominal varices.  No personal hx of fatty liver, hepatitis, or other liver disease.  No family hx of liver disease.  Records indicate hep c and hep b negative 2014.  Hep B core+ as of 11/2021 but otherwise hepatitis work up negative.  LFTs in [place] reviewed from 11/2021 and WNL (alt 39, alp 76, ast 37) as are his Fe+ studies. Plt 85 11/15/21.   INR 1.08 10/2021.    Hx of hld currently on pravastatin 2017.   No hx of DM and last a1c 5.4%.  Weight has been fairly stable at about 170-175 over past 6 years, was up to max of 210 in late 1990s.  Summer 2022 175 and Winter 2022 to 170.  No alcohol use.    Marijuana use daily.  Some reports of this causing inc lfts and inc fibrosis. Still doing that.  Denies other drug or tobacco use.  Denies use of otc herbals/supplements.  He has a hx of osteoporosis with last DEXA November 2021 improved, s/p 3 Reclast infusions, follows with Dr. Namita Long endocrinology. He says not seen then in a while and he will check in.  Seem by Viry Serrano PA-C, 12/8/21 for fecal occult +. 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 Denies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

## 2024-05-01 ENCOUNTER — LAB OUTSIDE AN ENCOUNTER (OUTPATIENT)
Dept: URBAN - METROPOLITAN AREA TELEHEALTH 2 | Facility: TELEHEALTH | Age: 64
End: 2024-05-01

## 2024-05-02 ENCOUNTER — OFFICE VISIT (OUTPATIENT)
Dept: URBAN - METROPOLITAN AREA TELEHEALTH 2 | Facility: TELEHEALTH | Age: 64
End: 2024-05-02
Payer: COMMERCIAL

## 2024-05-02 ENCOUNTER — DASHBOARD ENCOUNTERS (OUTPATIENT)
Age: 64
End: 2024-05-02

## 2024-05-02 VITALS
BODY MASS INDEX: 25.62 KG/M2 | TEMPERATURE: 98 F | SYSTOLIC BLOOD PRESSURE: 129 MMHG | HEIGHT: 69 IN | WEIGHT: 173 LBS | DIASTOLIC BLOOD PRESSURE: 82 MMHG

## 2024-05-02 DIAGNOSIS — R93.5 ABNORMAL MRI OF ABDOMEN: ICD-10-CM

## 2024-05-02 DIAGNOSIS — K74.60 CIRRHOSIS: ICD-10-CM

## 2024-05-02 DIAGNOSIS — R18.8 OTHER ASCITES: ICD-10-CM

## 2024-05-02 DIAGNOSIS — I81 PORTAL VEIN THROMBOSIS: ICD-10-CM

## 2024-05-02 PROBLEM — 389026000: Status: ACTIVE | Noted: 2024-05-02

## 2024-05-02 PROBLEM — 19943007: Status: ACTIVE | Noted: 2024-05-02

## 2024-05-02 PROCEDURE — 99215 OFFICE O/P EST HI 40 MIN: CPT

## 2024-05-02 RX ORDER — ACYCLOVIR 200 MG/1
1 CAPSULE CAPSULE ORAL TWICE A DAY
Status: ACTIVE | COMMUNITY

## 2024-05-02 RX ORDER — DRONABINOL 2.5 MG/1
1 CAPSULE IN THE EVENING OR AT BEDTIME CAPSULE ORAL
Status: ACTIVE | COMMUNITY

## 2024-05-02 RX ORDER — BICTEGRAVIR SODIUM, EMTRICITABINE, AND TENOFOVIR ALAFENAMIDE FUMARATE 50; 200; 25 MG/1; MG/1; MG/1
1 TABLET TABLET ORAL ONCE A DAY
Status: ACTIVE | COMMUNITY

## 2024-05-02 RX ORDER — LOPERAMIDE HYDROCHLORIDE 2 MG/1
1 TABLET AS NEEDED CAPSULE ORAL
Status: ACTIVE | COMMUNITY

## 2024-05-02 RX ORDER — APIXABAN 2.5 MG/1
1 TABLET, FILM COATED ORAL TWICE A DAY
Status: ACTIVE | COMMUNITY

## 2024-05-02 RX ORDER — PREGABALIN 75 MG/1
1 CAPSULE CAPSULE ORAL TWICE A DAY
Status: ACTIVE | COMMUNITY

## 2024-05-02 RX ORDER — PROPRANOLOL HYDROCHLORIDE 10 MG/1
1 TABLET TABLET ORAL TWICE A DAY
Status: ACTIVE | COMMUNITY

## 2024-05-02 RX ORDER — IMIQUIMOD 50 MG/G
1 APPLICATION AT BEDTIME, LEAVE ON FOR 8 HOURS THEN WASH OFF CREAM TOPICAL
Status: ACTIVE | COMMUNITY

## 2024-05-02 RX ORDER — PRAVASTATIN SODIUM 40 MG/1
1 TABLET TABLET ORAL ONCE A DAY
Status: ACTIVE | COMMUNITY

## 2024-05-02 RX ORDER — OMEPRAZOLE 40 MG/1
1 CAPSULE 30 MINUTES BEFORE MORNING MEAL CAPSULE, DELAYED RELEASE ORAL ONCE A DAY
Status: ACTIVE | COMMUNITY

## 2024-05-02 RX ORDER — VITAMIN A 2400 MCG
AS DIRECTED CAPSULE ORAL ONCE A DAY
Status: ACTIVE | COMMUNITY

## 2024-05-02 RX ORDER — LORATADINE 10 MG/1
1 TABLET TABLET ORAL ONCE A DAY
Status: ACTIVE | COMMUNITY

## 2024-05-03 ENCOUNTER — WEB ENCOUNTER (OUTPATIENT)
Dept: URBAN - METROPOLITAN AREA CLINIC 86 | Facility: CLINIC | Age: 64
End: 2024-05-03

## 2024-05-04 ENCOUNTER — TELEPHONE ENCOUNTER (OUTPATIENT)
Dept: URBAN - METROPOLITAN AREA CLINIC 86 | Facility: CLINIC | Age: 64
End: 2024-05-04

## 2024-05-04 ENCOUNTER — WEB ENCOUNTER (OUTPATIENT)
Dept: URBAN - METROPOLITAN AREA CLINIC 86 | Facility: CLINIC | Age: 64
End: 2024-05-04

## 2024-05-04 RX ORDER — SPIRONOLACTONE 50 MG/1
1 TABLET TABLET, FILM COATED ORAL ONCE A DAY
Qty: 30 | Refills: 0 | OUTPATIENT
Start: 2024-05-06 | End: 2024-06-05

## 2024-05-04 RX ORDER — FUROSEMIDE 20 MG/1
1 TABLET TABLET ORAL ONCE A DAY
Qty: 30 | Refills: 0 | OUTPATIENT
Start: 2024-05-06

## 2024-05-06 ENCOUNTER — LAB OUTSIDE AN ENCOUNTER (OUTPATIENT)
Dept: URBAN - METROPOLITAN AREA TELEHEALTH 2 | Facility: TELEHEALTH | Age: 64
End: 2024-05-06

## 2024-05-07 ENCOUNTER — TELEPHONE ENCOUNTER (OUTPATIENT)
Dept: URBAN - METROPOLITAN AREA CLINIC 86 | Facility: CLINIC | Age: 64
End: 2024-05-07

## 2024-05-07 ENCOUNTER — WEB ENCOUNTER (OUTPATIENT)
Dept: URBAN - METROPOLITAN AREA CLINIC 86 | Facility: CLINIC | Age: 64
End: 2024-05-07

## 2024-05-10 ENCOUNTER — LAB OUTSIDE AN ENCOUNTER (OUTPATIENT)
Dept: URBAN - METROPOLITAN AREA CLINIC 86 | Facility: CLINIC | Age: 64
End: 2024-05-10

## 2024-05-10 ENCOUNTER — TELEPHONE ENCOUNTER (OUTPATIENT)
Dept: URBAN - METROPOLITAN AREA CLINIC 86 | Facility: CLINIC | Age: 64
End: 2024-05-10

## 2024-05-12 ENCOUNTER — TELEPHONE ENCOUNTER (OUTPATIENT)
Dept: URBAN - METROPOLITAN AREA CLINIC 86 | Facility: CLINIC | Age: 64
End: 2024-05-12

## 2024-05-13 ENCOUNTER — LAB OUTSIDE AN ENCOUNTER (OUTPATIENT)
Dept: URBAN - METROPOLITAN AREA CLINIC 86 | Facility: CLINIC | Age: 64
End: 2024-05-13

## 2024-05-14 ENCOUNTER — TELEPHONE ENCOUNTER (OUTPATIENT)
Dept: URBAN - METROPOLITAN AREA CLINIC 86 | Facility: CLINIC | Age: 64
End: 2024-05-14

## 2024-05-15 ENCOUNTER — TELEPHONE ENCOUNTER (OUTPATIENT)
Dept: URBAN - METROPOLITAN AREA CLINIC 86 | Facility: CLINIC | Age: 64
End: 2024-05-15

## 2024-05-16 ENCOUNTER — WEB ENCOUNTER (OUTPATIENT)
Dept: URBAN - METROPOLITAN AREA CLINIC 86 | Facility: CLINIC | Age: 64
End: 2024-05-16

## 2024-05-17 ENCOUNTER — TELEPHONE ENCOUNTER (OUTPATIENT)
Dept: URBAN - METROPOLITAN AREA CLINIC 86 | Facility: CLINIC | Age: 64
End: 2024-05-17

## 2024-05-17 ENCOUNTER — WEB ENCOUNTER (OUTPATIENT)
Dept: URBAN - METROPOLITAN AREA CLINIC 86 | Facility: CLINIC | Age: 64
End: 2024-05-17

## 2024-05-18 ENCOUNTER — TELEPHONE ENCOUNTER (OUTPATIENT)
Dept: URBAN - METROPOLITAN AREA CLINIC 86 | Facility: CLINIC | Age: 64
End: 2024-05-18

## 2024-05-18 ENCOUNTER — WEB ENCOUNTER (OUTPATIENT)
Dept: URBAN - METROPOLITAN AREA CLINIC 86 | Facility: CLINIC | Age: 64
End: 2024-05-18

## 2024-05-20 ENCOUNTER — LAB OUTSIDE AN ENCOUNTER (OUTPATIENT)
Dept: URBAN - METROPOLITAN AREA CLINIC 86 | Facility: CLINIC | Age: 64
End: 2024-05-20

## 2024-05-20 ENCOUNTER — TELEPHONE ENCOUNTER (OUTPATIENT)
Dept: URBAN - METROPOLITAN AREA CLINIC 86 | Facility: CLINIC | Age: 64
End: 2024-05-20

## 2024-05-21 ENCOUNTER — TELEPHONE ENCOUNTER (OUTPATIENT)
Dept: URBAN - METROPOLITAN AREA CLINIC 86 | Facility: CLINIC | Age: 64
End: 2024-05-21

## 2024-05-21 ENCOUNTER — WEB ENCOUNTER (OUTPATIENT)
Dept: URBAN - METROPOLITAN AREA CLINIC 86 | Facility: CLINIC | Age: 64
End: 2024-05-21

## 2024-05-23 ENCOUNTER — TELEPHONE ENCOUNTER (OUTPATIENT)
Dept: URBAN - METROPOLITAN AREA CLINIC 86 | Facility: CLINIC | Age: 64
End: 2024-05-23

## 2024-05-23 RX ORDER — FUROSEMIDE 20 MG/1
1 TABLET TABLET ORAL ONCE A DAY
Qty: 90 TABLET | Refills: 0
Start: 2024-05-06

## 2024-05-23 RX ORDER — SPIRONOLACTONE 50 MG/1
1 TABLET TABLET, FILM COATED ORAL ONCE A DAY
Qty: 90 TABLET | Refills: 0
Start: 2024-05-06 | End: 2024-08-21

## 2024-05-31 ENCOUNTER — WEB ENCOUNTER (OUTPATIENT)
Dept: URBAN - METROPOLITAN AREA CLINIC 86 | Facility: CLINIC | Age: 64
End: 2024-05-31

## 2024-06-03 ENCOUNTER — LAB OUTSIDE AN ENCOUNTER (OUTPATIENT)
Dept: URBAN - METROPOLITAN AREA CLINIC 86 | Facility: CLINIC | Age: 64
End: 2024-06-03

## 2024-07-08 ENCOUNTER — WEB ENCOUNTER (OUTPATIENT)
Dept: URBAN - METROPOLITAN AREA CLINIC 86 | Facility: CLINIC | Age: 64
End: 2024-07-08

## 2024-07-12 ENCOUNTER — WEB ENCOUNTER (OUTPATIENT)
Dept: URBAN - METROPOLITAN AREA CLINIC 92 | Facility: CLINIC | Age: 64
End: 2024-07-12

## 2024-07-16 ENCOUNTER — OFFICE VISIT (OUTPATIENT)
Dept: URBAN - METROPOLITAN AREA TELEHEALTH 2 | Facility: TELEHEALTH | Age: 64
End: 2024-07-16
Payer: COMMERCIAL

## 2024-07-16 VITALS — HEIGHT: 69 IN | WEIGHT: 136 LBS | BODY MASS INDEX: 20.14 KG/M2

## 2024-07-16 DIAGNOSIS — K76.6 PORTAL HYPERTENSION: ICD-10-CM

## 2024-07-16 DIAGNOSIS — R19.5 OCCULT BLOOD POSITIVE STOOL: ICD-10-CM

## 2024-07-16 DIAGNOSIS — I85.10 ESOPH VARICE OTHER DIS: ICD-10-CM

## 2024-07-16 DIAGNOSIS — K74.60 CIRRHOSIS OF LIVER WITHOUT ASCITES, UNSPECIFIED HEPATIC CIRRHOSIS TYPE: ICD-10-CM

## 2024-07-16 PROCEDURE — 99215 OFFICE O/P EST HI 40 MIN: CPT

## 2024-07-16 RX ORDER — BICTEGRAVIR SODIUM, EMTRICITABINE, AND TENOFOVIR ALAFENAMIDE FUMARATE 50; 200; 25 MG/1; MG/1; MG/1
1 TABLET TABLET ORAL ONCE A DAY
Status: ACTIVE | COMMUNITY

## 2024-07-16 RX ORDER — PROPRANOLOL HYDROCHLORIDE 10 MG/1
1 TABLET TABLET ORAL AT NIGHT
Status: ACTIVE | COMMUNITY

## 2024-07-16 RX ORDER — OMEPRAZOLE 40 MG/1
1 CAPSULE 30 MINUTES BEFORE MORNING MEAL CAPSULE, DELAYED RELEASE ORAL ONCE A DAY
Status: ACTIVE | COMMUNITY

## 2024-07-16 RX ORDER — LORATADINE 10 MG/1
1 TABLET TABLET ORAL ONCE A DAY
Status: ACTIVE | COMMUNITY

## 2024-07-16 RX ORDER — ACYCLOVIR 200 MG/1
1 CAPSULE CAPSULE ORAL TWICE A DAY
Status: ACTIVE | COMMUNITY

## 2024-07-16 RX ORDER — IMIQUIMOD 50 MG/G
1 APPLICATION AT BEDTIME, LEAVE ON FOR 8 HOURS THEN WASH OFF CREAM TOPICAL
Status: ACTIVE | COMMUNITY

## 2024-07-16 RX ORDER — APIXABAN 2.5 MG/1
1 TABLET, FILM COATED ORAL TWICE A DAY
Status: ACTIVE | COMMUNITY

## 2024-07-16 RX ORDER — LOPERAMIDE HYDROCHLORIDE 2 MG/1
1 TABLET AS NEEDED CAPSULE ORAL
Status: ACTIVE | COMMUNITY

## 2024-07-16 RX ORDER — DRONABINOL 2.5 MG/1
1 CAPSULE IN THE EVENING OR AT BEDTIME CAPSULE ORAL
Status: ACTIVE | COMMUNITY

## 2024-07-16 RX ORDER — FUROSEMIDE 40 MG/1
1 TABLET TABLET ORAL ONCE A DAY
Qty: 90 TABLET | Refills: 0 | Status: ACTIVE | COMMUNITY
Start: 2024-05-06

## 2024-07-16 RX ORDER — SPIRONOLACTONE 100 MG/1
1 TABLET TABLET, FILM COATED ORAL ONCE A DAY
Qty: 90 TABLET | Refills: 0 | Status: ACTIVE | COMMUNITY
Start: 2024-05-06 | End: 2024-08-21

## 2024-07-16 RX ORDER — VITAMIN A 2400 MCG
AS DIRECTED CAPSULE ORAL ONCE A DAY
Status: ACTIVE | COMMUNITY

## 2024-07-16 RX ORDER — PREGABALIN 75 MG/1
1 CAPSULE CAPSULE ORAL TWICE A DAY
Status: ACTIVE | COMMUNITY

## 2024-07-16 RX ORDER — PRAVASTATIN SODIUM 40 MG/1
1 TABLET TABLET ORAL ONCE A DAY
Status: ACTIVE | COMMUNITY

## 2024-07-16 NOTE — EXAM-PHYSICAL EXAM
Telemed self reported:  Gen: no distress. Some muscle loss. Eyes: no jaundice. Mouth: no thrush. CV: no chest pain. Resp: no wheezes. Abd: no pain. Ascites doing better post tap. Ext: no edema.	 Neuro: no weakness.

## 2024-07-16 NOTE — HPI-TODAY'S VISIT:
This is a 63 year old male referred  by Viry Serrano PA-C and last seen May 2-24 for an evaluation of abnormal MRI with suspected cirrhosis/portal htn and new clot seen on scan and ascites needing frequent taps and undergoing eval at [facility] with Dr Thakkar.  A copy of this note will be sent to the referring provider.  Dr Thakkar is to see the pt at some point.  [facility] review: June 26 2024 PT  17.2 and inr 1.5 and na 132 and k 4.7 and cl 96 and co2 28 and ca 8.9 and bun 18 and cr 0.67 and glucose 120.  Total protein 6.9 and alb 3.0 and alk 195 and ast 43 and alt 34 and tb 1.1 and wbc 5.8 and hg 15.9 and hct 47.4 and mcv 92.2 and platelet 139. Neutrophils 3.75 and lymphs 1.05 and monocytes 0.72 little up. afp 4.5. Meld 11 and meld na 11 and meld 3.0 15.  All the labs showed smooth muscle antibody positive at 320 AMA negative at 6.8 alpha-1 M1M1 with a level 229.  aFP 4.6.  Gamma GTP 71.  Hemoglobin A1c 6.1.  TSH 3.6.  PETH level negative.  PSA 1.15.  F-actin low positive at 38.  6 ferritin 178.  INR 1.8.  Prior CMV.  Prior Piyush-Barr.  No hep A immunity so needs hep A vaccine and he will check Hep B core positive and hep B surface antibody 261 surface antigen negative.  Antibody negative.  QuantiFERON test negative.  RPR was negative.  Urine screen was positive for marijuana metabolites.  Blood type B+.  June 24 liver bx: A. 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 Patchy bridging fibrosis and marked parenchymal architectural distortion. See comment. The liver biopsy shows portal tracts with variable absence, narrowing or obliteration of the portal veins. Some of the hepatic arterioles appear thickened. Except for rare portal tracts with mild lymphocytic inflammation and one with periductal inflammation and biliary epithelial damage, there is no portal inflammation and  intrahepatic bile ducts are preserved. Mild ductular reaction is noted. The lobules show perivenular dilatation and irregular contours of the sinusoids. Some of the central veins are dilated. Distortion of the parenchymal architecture is evidenced by irregularly distributed bridging fibrous septa, as well as approximation of portal tracts and central veins (immunohistochemical stain for glutamine synthetase). The abnormal architecture is further highlighted by the reticulin stain which shows irregularly distributed 2 cell thick plates and areas of reticulin collapse.Trichrome stain highlights expanded portal tracts with focal bridging fibrosis. Iron and PASD stains are negative. CK-7 highlights bile ductular proliferation.  History is noted of cirrhosis (diagnosed on imaging), HIV (viral load now undetectable), portal vein thrombosis, portal hypertension, splenomegaly and ascites.  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 This might be idiopathic or related to drug/medication-associated endothelial/ vascular damage.  DDI was on the past and so raises that a question for this.  Tap 7- 7.4 liters. July 1 6.7 liters. June 17 8.0 liters June 1 7.6 liters May 15 5 liters.  Need to see what the plan from Dr Thakkar re TIPS option.  Mri 1: EXAM: MR ABDOMEN W AND WO CONTRAST  CLINICAL INDICATION: cirrhosis. As per medical chart review (Colon & Rectal Surgery note 3/12/2024): Cirrhosis (2/2 to [facility]), PV thrombus on Eliquis, HIV+(CD4 210 Vl 25 12/23). Anal canal condyloma left anterior, s/p excision of anal canal condyloma (anterior, left lateral) on 4/19/22.  TECHNIQUE: Multisequence, multiplanar MRI of the abdomen was performed without and with intravenous contrast. ESRC.2.7.3  COMPARISON: 5/1/2024, 7/6/2023 and 11/29/2022.  FINDINGS: Lower Thorax: Bibasilar atelectasis.  Liver: No fat or iron. Cirrhotic morphology of the liver with nodular contour, lobar redistribution and fissural widening. No suspicious focal lesion.  Gallbladder/Biliary Tree: Normal.  Spleen: Splenomegaly, measuring 17.0 cm in the craniocaudal axis.  Pancreas: Normal.  Adrenal Glands: Normal.  Kidneys/Ureters: Subcentimeter bilateral renal cysts. No hydronephrosis.  Gastrointestinal: Normal.  Lymph Nodes: Normal.  Vessels: Residual nonocclusive thrombus in the left portal vein, main portal vein, and portomesenteric confluence, slightly improved when compared to prior dated 11/9/2023. Similar upper abdominal perigastric, perisplenic and kaylynn hepatis varices.  Peritoneum/Retroperitoneum: Large volume of ascites.  Bones/Soft Tissues: No aggressive osseous lesion.  IMPRESSION: 1.  No suspicious hepatic lesion. 2.  Morphologic changes of chronic liver disease with sequelae of portal hypertension including worsening splenomegaly, large volume of ascites and upper abdominal varices. 3.  Redemonstration of nonocclusive thrombus within the left portal vein, main portal vein and portomesenteric confluence, slightly improved when compared to prior dated 11/9/2023.  The images were reviewed and interpreted by Mariya Farmer MD  Egd and colon not done yet scheduled to Dr Chester? Says was done in 2022.  May 28 2024:  Dr Thakkar Note: Assessment and Plan Seth Wilkerson is a very pleasant 63 y.o. male who is referred for further evaluation and management of decompensated cirrhosis with ascites.  1. Cirrhosis Unclear etiology of his liver disease though suspect may be secondary to antiretroviral therapy.  His MELD score is 15 and his disease is decompensated with new onset ascites recently requiring large-volume paracentesis, and portal vein thrombosis.  Antiretroviral therapy can lead to a hepatitis, steatosis, or steatohepatitis, leading to liver fibrosis.  He does also have some other risk factors for metabolic associated steatotic liver disease.  No mention of any significant steatosis on imaging however.  You can also see noncirrhotic portal hypertension in patients on antiretroviral therapy.  Given the possible implications including need for liver transplantation, I would recommend he complete a transjugular liver biopsy with pressure measurements to determine degree of fibrosis and whether or not he has noncirrhotic portal hypertension vs cirrhosis.  2. Ascites/volume overload Titrate up lasix to 40mg and aldactone to 100mg daily and repeat labs in 1-2 weeks. Low sodium diet (<2 g) and <2L of fluid daily. LVP PRN ordered  3. Hepatic encephalopathy No issues  4. Variceal screening/portal hypertension History of grade II EV with banding x 3 in 11/2022. Due. Spoke with Dr. Dominguez, he requests we complete EGD here. Will order.  5. Hepatocellular carcinoma screening Due for MRI in 11/2024. Will order.  6. Transplant candidacy Will plan the above and then determine if we need to complete a full transplant evaluation.  Thank you for allowing me to participate in the care of your patient. If you have any questions or concerns please do not hesitate to contact me or my office. John Thakkar MD Transplant Hepatology  Prior labs:  May 21 labs show sodium 135 which is a little lower than 139 last week. Not uncommon to see this go a little lower due to the water pills, but this is still still in the acceptable range for someone on diuretics for ascites like you.   Potassium 4.8 which is normal but a little higher. Chloride 99 CO2 30 calcium 9.1 BUN of 18 creatinine 0.66 so that remains stable for you. Glucose was a little bit up at 112 and unclear if you were fasting. Labs on? Total protein 7.1 albumin remains about the same at 3.1 which is a little low alk phos 144 AST 40 ALT 25 and these are about the same. Bilirubin lower at 1.0 which is good to see.    No acute issues seen with these labs to suggest any need for changes and from the notes that you have mention so far, it appears that your weight is staying lower and possibly dropping somewhat.    Need to follow the trend.    Pending how you do with your weight that we can talk about next steps and options.  Dr. Alberto Wilkerson, May 15 cytology came back negative for any malignant cells. This is what we expected but just wanted to confirm this. Fluid studies also are negative for growth to date which is also good to confirm. We responded to your message to the portal so we saw today. Dr. Alberto Wilkerson,   More fluid studies back from May 15 showing the Gram stain with rare WBCs and no organisms seen on Gram stain which is good to know. That means that there is no clear signs of any bacteria seen on the initial studies of the fluid. The ascites fluid culture so far shows no growth which is also good to confirm and is in line with what we saw with the low neutrophil count in the fluid. Still waiting any additional studies. Please let us know how you are doing with regards to your weight as our hope is that your weight will remain stable with your diuretics. Dr. Alberto Wilkerson, Received several reports today and one of them was the final official ultrasound report that showed that they removed 5 L of fluid. Typically they will replace albumin if you have more than 5 L removed and so that may be why you did not receive the IV albumin. The final fluid counts came back with a nucleated cell count again at 148 which is well below the 250 cut off.Also the neutrophils were 40% and lymphocytes 10% and macrophages 50% so clearly not infected. The albumin level was low at less than 1.5 suggesting again that fluid is more likely from a liver source.The total protein also was low at less than 3 suggesting a liver source more likely for the fluid as well. I hope that the tap helps get  you the relief that you need and that the diuretics can control and keep the weight from reaccumulating.  Please let us know. Dear  Seth Rock, Ascites fluid study sent to us from today May 15. 148 nucleated cells were seen and typically if the cell count is below 250 cells, there is no infection suspected in the fluid, so that would be consistent with that.  We still await the actual differential of these cells. We also await other tests to guide as to source if possible. We have not seen the actual report either of the procedure as it is still preliminary phase and not released. So not sure how much removed. Hope you are feeling better.  Please track your weights now post the tap and see if the weight remains stable but at a lower level. Dr Alberto Wilkerson, We saw your labs from today shortly after that initial message.   Sodium was 139 potassium 4.5 BUN 15 creatinine 0.69 glucose 107 which is elevated. Your labs are felt stable versus prior when pre diuretic labs showed a BUN of 18 creatinine 0.59 and a glucose of 122.  The potassium did go up slightly but still normal from 3.8 to the 4.5. So no issues tolerating the current dose of diuretics seen.   Albumin remains slightly low at 3.1 and lower than 3.2 before. Liver normal at 3.5.  Alk phos was normal at 104, AST was slightly higher at 42 ALT of 23 and bilirubin 1.3. Again not very different from your baseline.  We await the  paracentesis tomorrow with fluid studies and hopefully youwill feel better with that.   We updated Dr Matthew re the treatment plan and they are  aware of it also.    Dr Alberto Wilkerson,  Labs from your comprehensive metabolic profile today, May 7, back today.  Sodium 139 potassium 3.8 chloride 102 CO2 27 calcium 8.8 BUN of 18 creatinine 0.59 glucose low at 122 from previous 194 about 3 months ago. Albumin was a little low at 3.2. It had been normal before. Alk phos was slightly up at 111 AST 38 ALT 22 so not significantly changed and in fact a little lower than 3 months ago. Bilirubin was slightly up at 1.2 from 1.1.  These laboratories are stable for you to take the furosemide 20 mg once a day and the spironolactone 50 mg once a day that we called in for you and lets see how you do.  I would recommend that you repeat these labs again in about a week to see how you are doing on that regimen.  I would also ask you to follow your weights to see what that trend shows.  Thank you.  Dr. Dominguez Dear Seth Wilkerson,   May 3 labs: were looked at late yesterday and discussed with Dr Matthew in the afternoon but we both noted that CMP was not back and initially appeared to be pending to be resulted.  Checked today and remains not back and not showing as pending now.  A new CMP order put in by Dr Matthew Friday appears to be there as a future order to activate with your next lab draw.  This is an issue as needed cmp to check your renal function as a potential cause of fluid issues and to guide as baseline renal status for diuretics which could affect this and to compare to. Also cmp values are needed to calculate liver status predictive model scores.   PT was 17.5 and inr 1.5 which are a little up. These were normal when last done. Ammonia level 29 normal which was good to see.PSA 1.9 normal. HIV load is pending. CD4 was 161. A1c 5.8% little up.   Although not ideal, given your ascites issues, we could potentially start low dose lasix and aldactone today and have you take through Monday and then wait to see what labs look like on Monday. Needed to confirm this issue with you.  Tried to call you x 2 and could not get you.  So sending info via portal message. Please let us know and where to send these prescriptions to also.  If ascites feels like it is getting worse, please go to ER to get seen and they can potentially help arrange tap and get needed labs and start tx immediately in the interim.  Thank you.   Dr Dominguez  May 1 mri: Narrative & ImpressionEXAM: MR ABDOMEN W AND WO CONTRAST CLINICAL INDICATION: cirrhosis.As per medical chart review (Colon & Rectal Surgery note 3/12/2024): Cirrhosis (2/2 to [facility]), PV thrombus on Eliquis, HIV+(CD4 210 Vl 25 12/23). Anal canal condyloma left anterior, s/p excisionof anal canal condyloma (anterior, left lateral) on 4/19/22. TECHNIQUE: Multisequence, multiplanar MRI of the abdomen was performed without and with intravenous contrast. ESRC.2.7.3 COMPARISON: 5/1/2024, 7/6/2023 and 11/29/2022. FINDINGS:Lower Thorax: Bibasilar atelectasis. Liver: No fat or iron. Cirrhotic morphology of the liver with nodular contour, lobar redistribution and fissural widening. No suspicious focal lesion. Gallbladder/Biliary Tree: Normal. Spleen: Splenomegaly, measuring 17.0 cm in the craniocaudal axis. Pancreas: Normal. Adrenal Glands: Normal. Kidneys/Ureters: Subcentimeter bilateral renal cysts. No hydronephrosis. Gastrointestinal: Normal. Lymph Nodes: Normal. Vessels: Residual nonocclusive thrombus in the left portal vein, main portal vein, and portomesenteric confluence, slightly improved when compared to prior dated 11/9/2023. Similar upper abdominalperigastric, perisplenic and kaylynn hepatis varices. Peritoneum/Retroperitoneum: Large volume of ascites. Bones/Soft Tissues: No aggressive osseous lesion. IMPRESSION:1.  No suspicious hepatic lesion.2.  Morphologic changes of chronic liver disease with sequelae of portal hypertension including worsening splenomegaly, large volume of ascites and upper abdominal varices.3.  Redemonstration of nonocclusive thrombus within the left portal vein, main portal vein and portomesenteric confluence, slightly improved when compared to prior dated 11/9/2023.\~The images were reviewed and interpreted by Mariya Farmer MD.  He has ascites: he has never had a paracentesis.  No recent labs. Need them to consider getting labs to consider tap.  He needs to consider diuretics.  He says she was concerned re brain fog,    January 2024 ferritin level normal at 89 and iron sat normal at 34% sodium 138 potassium 4.3 chloride 105 CO2 26 BUN of 9 creatinine 0.64 glucose elevated at 194 albumin 3.5 alk phos slightly up at 109 coming down from 110 before.  AST slightly up at 42 and ALT of 38 previously AST 35 and ALT 29 back in December.  Bilirubin 1.1 down from 1.2 in December.  WBC 4.4 hemoglobin normal again at 14.6 hematocrit 41.5 platelet count was 80.  Back in December hemoglobin slightly up at 16.2 and platelet count 89.  December 2023 labs showed A1c of 7.1 up from 5.4 in October 2021.  Vitamin D level was 34.  B12 was 570 TSH was 3.04 cholesterol 148 triglycerides 84 HDL 40 LDL 91 WBC 5.3 hemoglobin 16.2 plate count 89 sodium 138 potassium 4.9 urine 16 creatinine 0.79 glucose 174 albumin 3.7 alk phos 110 AST 35 ALT 29 HIV load was positive at 25 CD4 count was 210 up from 161 in May.  April 2024 visit with Dr. Matthew mention that his CD4 count may be depressed due to hypersplenism his viral load was 25 and there is hep B immune from previous infection and was a nonresponder to hep A vaccine.  She has osteopenia now from previous osteoporosis and bone density 2021 normal.  She coming on his diabetes being noted.Seen by Dr. Solano on January 2024 for concern for irregular heartbeat in electrocardiogram showed normal sinus rhythm and normal conduction.  There were no plan to check 48-hour Holter monitor per notes from indication noted.Appears to have possibly stress test for April 29. Pending that for clearance to see Dr Terry.  Offered a therapeutic paracentesis.  November 9 2023 MRI ordered by Brady Hawk sent in.Lower thorax normal. Liver with no fat or iron but with morphologic changes of chronic liver disease seen including nodular surface contour, lobar redistribution and fissural widening.  No new suspicious lesions. Gallbladder/biliary tree normal. Spleen mildly enlarged measuring up to 15.8 cm. Pancreas normal. Adrenal glands normal. Kidneys showing subcentimeter bilateral renal cysts but no hydronephrosis. Gastrointestinal views normal. Lymph nodes normal. They mention extensive motion artifact on postcontrast imaging and this limited views but they suggested probable residual nonocclusive thrombus in the left central portal vein similar to July 6, 2023 and additional possible minimal nonocclusive filling defects within the main portal vein cannot be excluded on this imaging.  Patent SMV.  Similar upper abdominal perigastric and perisplenic kaylynn hepatis varices. He does not recall any issues. Trace free fluid seen in the abdomen. No aggressive osseous lesions. In summary, evaluation residual portal vein thrombosis was significantly suboptimal secondary to extensive motion artifact on the postcontrast imaging.  There is a suggestion of probable residual nonocclusive thrombus within the left portal vein as seen in July 6 and additional minimally occlusive filling defects in the main portal vein cannot be excluded recommend continued attention on short-term follow-up. Morphologic changes of chronic liver disease with sequela of portal hypertension and mild splenomegaly and trace ascites in upper abdominal varices seen.  Asked if he was on the low salt diet and he has gone on a low salt diet and is looking at salt but he does not recall a particular episode.  He noted swelling 2 weeks ago.   November 2, 2023.  Debated on the date that the patient saw Dr. Terry to be scheduled for an EGD with variceal banding but patient needed to see cardiology prior to the EGD and to hold the Eliquis for 3 days before. Jan 18 2024 appt dr Zackary Solano. He is to see him as she thought of irregular heart beat. Has not been done yet.  He is pending some labs to be done for Dr Menjivar and to do this. Dr Matthew in Jan 2024.   July 24 labs show ferritin slightly higher at 100 from January when it was 78 and in June of last year was 42.  Iron saturation was 34% and back in May had been 42% and in January it had been 32%. Sodium 138 potassium 4.4 chloride 104 CO2 25 calcium 8.8 BUN of 14 creatinine 0.7 glucose was up at 183 albumin 3.8 total protein 7.8 alkaline phosphatase slightly up at 112 and had been previously normal at 88.  AST of 32 and ALT of 39 and previously AST 42 and ALT 46.  WBC 4.6 hemoglobin 15.6 platelet count 75 MCV 90.5. Asked if any recent issues to explain alk phos and he denied any issue of new meds. May 2023 HIV level was 40 and CD4 count was 161 and was previously 147.  Dr Hughes is following 2 x a year.  July 25 saw Dr Hughes: summary she cut back eliquis dose to half. Iron deficiency anemia     Normal ferritin, off iron supplements        Thrombocytopenia  : Stable platelet count, asymptomatic  Patient can continue on anticoagulation as long as platelet count is above 50,000 and no bleeding Next follow up appointment in 6 months.  Portal and mesenteric vein thrombosis:  Elevated Ddimer as per today labs Despite Ddimer is elevated, I suggested decreasing apixaban to 2.5 mg BID. Although patient believes that fatigue is related to Apixaban, I suspect that is related to propranolol. He will discuss with Dr. Dominguez (hepatologist)  He says he told her that he does not like blood thinners.  Re the beta blocker, option if this is not to be used is to do banding instead and he has concerns with doing the banding and that limits options. Beta blocker helps lower the risk for bleeding.  He does not want to do the banding again due to pain he had from that. He lost weight.  He could also do a diagnostic egd if the improvement of the clot has also led to improvement. That would be another option to discuss with Dr Terry.  July 6 mri: EXAM: MR ABDOMEN W AND WO CONTRAST   CLINICAL INDICATION: follow up SMV/PV thrombosis on previous hepatic imaging before hematology appt in July.   TECHNIQUE: Multisequence, multiplanar MRI of the abdomen was performed without and with intravenous contrast. ESRC.2.7.3   COMPARISON: MRI abdomen 11/29/2022, and 3/13/2023   FINDINGS:  Lower Thorax: Normal.   Liver: No fat or iron. Unchanged morphologic changes of chronic liver disease without suspicious lesion.    Gallbladder/Biliary Tree: Normal.   Spleen: Splenomegaly measuring 16.6 cm in CC dimension, previously 16.3 cm in cc dimension 3/13/2023.   Pancreas: Normal.   Adrenal Glands: Normal.    Kidneys/Ureters: Unchanged bilateral simple renal cysts. No hydronephrosis.   Gastrointestinal: Normal.    Lymph Nodes: Normal.   Vessels: Residual peripheral nonocclusive thrombus noted in the distal portal vein and left hepatic vein (14:44 Stable varices.   Peritoneum/Retroperitoneum: Normal.   Bones/Soft Tissues: Degenerative changes of the spine. No aggressive osseous lesions.   IMPRESSION:   1. Nonocclusive residual peripheral thrombus in the main portal vein and left hepatic vein, with significant decrease of clot burden compared to March 2023. 2. Morphologic changes of chronic liver disease with stigmata of portal hypertension, without suspicious hepatic lesion.   The images were reviewed and interpreted by Mariya Farmer MD.  He may want to plan to do the mri in Nov which will be 4m later again and reassess how the residual clot is doing. If the clot is continuing to be better that may be doing better.  March 2023 mri: EXAM: MR ABDOMEN W AND WO CONTRAST   CLINICAL INDICATION: cirrhosis.   TECHNIQUE: Multisequence, multiplanar MRI of the abdomen was performed without and with intravenous contrast. ESRC.2.7.3   COMPARISON: 11/29/2022   FINDINGS:  Lower Thorax: Clear lung base.   Liver: Morphologic changes of chronic liver disease. No suspicious hepatic lesion.   Gallbladder/Biliary Tree: Unremarkable gallbladder. No intra or extrahepatic biliary ductal dilatation.   Spleen: Spleen is enlarged measuring up to 16 cm in the craniocaudal dimension.   Pancreas: No main ductal dilatation.   Adrenal Glands: No adrenal nodules.   Kidneys/Ureters: No hydronephrosis. Bilateral renal cysts.   Gastrointestinal: Nonobstructive bowel loops.    Lymph Nodes: No suspicious lymph nodes by size criteria.   Vessels: Since 11/29/2022, interval significant decrease in known SMV and portal vein thrombosis, with minimal residual filling defects in the portal vein and no filling defects in the SMV. Bilateral portal veins are patent. Patent splenic vein. Upper abdominal varices.   Peritoneum/Retroperitoneum: No free air or free fluid.   Bones/Soft Tissues: Multilevel degenerative changes in the spine. No suspicious osseous lesion. Unremarkable soft tissues.     IMPRESSION: 1.  Hepatic cirrhosis with manifestations of portal hypertension, without suspicious focal hepatic lesion. 2.  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 Patent splenic veins.   Komal pulled the labs and imaging for your visit from [place] for us: January 23 recent labs show white blood cell count 4.0 which is slightly low hemoglobin elevated at 16.6 hematocrit elevated at 48.2 (sees heme) and platelet count diminished at 73.  Previously your platelet count had been 85 and hemoglobin had been 15.5 in December.  WBC had been 4.6 back in December as well. Neutrophils were 2.27 lymphocytes 1.03 which were normal. Sodium was 140 potassium 5.0 chloride 105 CO2 of 28 BUN 13 creatinine 0.75 glucose was 165.  Albumin was 4.1.  Alkaline phosphatase 95 AST was 36 ALT 32 total bilirubin 0.8.  Previously your AST was 36 ALT of 35 so these appear to be quite stable. Your iron saturation was normal at 32% and ferritin was normal at 78.  NEW MRI:  Your recent March 13 MRI showed lower thorax had clear lung bases.   Liver had morphologic changes of chronic liver disease with no suspicious liver lesions. Unremarkable gallbladder was seen with no intra extrahepatic bile duct dilation. Spleen was enlarged at 16 cm. Pancreas showed no main ductal dilation. Adrenal glands showed no adrenal nodules. Kidney showed no hydronephrosis.  Bilateral renal cysts were seen. Lymph nodes showed no suspicious lymph nodes by size criteria. The vessels showed interval decrease significantly in the known SMV and portal vein thrombosis with minimal residual filling defects seen in the portal vein and no filling defects seen in the SMV.  Bilateral portal veins were patent.  Patent splenic vein was seen.  Upper abdominal varices were seen. Multilevel degenerative changes seen in the spine with no suspicious osseous lesions. Overall, they mention you still had signs of cirrhosis with manifestations of portal hypertension without suspicious lesions and a significant interval resolution of the SMV thrombosis with minimal residual filling defect seen in the main portal vein now and patent splenic veins. I would recommend that we repeat the scan again in approximately 4 months to make sure that it continues to show further improvement and complete resolution if possible but overall very happy to see what it reported and please share with local providers.  He was started on propanolol and eliquis x 3 months.  He sees Hematology 2 x a year.  Narrative & Impression EXAM: MR ABDOMEN W AND WO CONTRAST   CLINICAL INDICATION: cirrhosis.   TECHNIQUE: Multisequence, multiplanar MRI of the abdomen was performed without and with intravenous contrast. ESRC.[id number]   COMPARISON: 11/29/2022   FINDINGS:  Lower Thorax: Clear lung base.   Liver: Morphologic changes of chronic liver disease. No suspicious hepatic lesion.   Gallbladder/Biliary Tree: Unremarkable gallbladder. No intra or extrahepatic biliary ductal dilatation.   Spleen: Spleen is enlarged measuring up to 16 cm in the craniocaudal dimension.   Pancreas: No main ductal dilatation.   Adrenal Glands: No adrenal nodules.   Kidneys/Ureters: No hydronephrosis. Bilateral renal cysts.   Gastrointestinal: Nonobstructive bowel loops.    Lymph Nodes: No suspicious lymph nodes by size criteria.   Vessels: Since 11/29/2022, interval significant decrease in known SMV and portal vein thrombosis, with minimal residual filling defects in the portal vein and no filling defects in the SMV. Bilateral portal veins are patent. Patent splenic vein. Upper abdominal varices.   Peritoneum/Retroperitoneum: No free air or free fluid.   Bones/Soft Tissues: Multilevel degenerative changes in the spine. No suspicious osseous lesion. Unremarkable soft tissues.     IMPRESSION: 1.  Hepatic cirrhosis with manifestations of portal hypertension, without suspicious focal hepatic lesion. 2.  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 Patent splenic veins.   Epics notes: He saw Dr. Hughes in hematology on January 23, 2023 with labs showing ferritin 78 iron sat 32% sodium 140 potassium 5.0 chloride 105 CO2 of 28 calcium 9.6 BUN of 13 creatinine 0.75 glucose elevated 165 albumin 4.1 alk phos 95 AST 36 ALT 32 total bilirubin 0.8.  Her note mentions that the patient was recently started on anticoagulation due to the superior mesenteric and portal vein thrombosis and seen and platelet count was noted to be 73,000 at that time.  He is on apixaban 5 mg twice daily and his last MRI was in November 2022 and extremities issues.  They felt that  could continue anticoagulation as long as his platelet count remained above 50,000 and no bleeding was seen.  She was planning to see him again on approximately July 24, 2023  He should get the mri redone in early in [place] and so that would be available for when sees hematology.  EXAM: MR ABDOMEN W AND WO CONTRAST  CLINICAL INDICATION: Cirrhosis Of Liver.  TECHNIQUE: Multisequence, multiplanar MRI of the abdomen was performed without and with intravenous contrast. ESRC.[id number]  COMPARISON: 11/30/2021 MRI, 11/12/2022 CT   FINDINGS:  Evaluation is slightly limited by respiratory motion artifact and subsequent misregistration on subtraction imaging.   Lower Thorax: Normal.   Liver: No fat or iron. Morphologic changes of chronic liver disease. Evaluation for arterially enhancing lesions is limited due to early arterial phase of contrast with nonopacification of the portal vein, in part due to known portal venous thrombus. Within these confines: No arterially enhancing lesions. No lesions with washout or capsule.    Gallbladder/Biliary Tree: Normal.   Spleen: Enlarged measuring 16 cm.   Pancreas: Mildly atrophic without ductal dilation. No focal suspicious lesions.   Adrenal Glands: Normal.    Kidneys/Ureters: Renal cysts.   Gastrointestinal: No obstruction.    Lymph Nodes: Normal.   Vessels: Partial opacification of the superior mesenteric vein extending to the main portal vein, seen on prior CT. Near complete opacification of the left portal vein. The right portal vein branches are partially occluded centrally and grossly patent distally. Overall, venous occlusion appears to be secondary to bland thrombus in the main portal and superior mesenteric vein, despite presence of diffuse restricted diffusion. Evaluation for underlying enhancement is limited within the intrahepatic branches due to respiratory motion artifact and misregistration.   Splenic vein is patent. Small upper abdominal, perigastric and paraesophageal varices. Abdominal aorta is normal in course and caliber.   Peritoneum/Retroperitoneum: No significant ascites.   Bones/Soft Tissues: No aggressive osseous lesions.   IMPRESSION: 1. Morphologic changes of chronic liver disease. No definite suspicious liver lesions within the limitations described above. 2. 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 Findings are favored secondary to bland thrombus; however, in this high-risk patient, underlying isolated tumor thrombus is difficult to exclude given presence of restricted diffusion and  extensive respiratory motion artifact and misregistration which limits the use of subtraction imaging. Attention on short-term interval follow-up MRI is recommended. 3. Sequela of portal hypertension, as above.   Critical findings were communicated electronically with and acknowledged by Dr. Zach Menjivar on 11/14/2022 10:49 AM. The following impression point(s) have been communicated: Portal vein and SMV filling defect highly suspicious for thrombus.   The images were reviewed and interpreted by Reid Boykin MD. Addended by Reid Boykin MD on 11/14/2022 12:18 PM  Dr Hughes wanted to put him on blood thinner and we needed to see what Dr Terry said: She mentioned that she would proceed to AC and she wanted on beta blocker as possible. He had been banded x 3 by her recently.  He says he did not speak with her: he reaches her through the call office : [phone]  Last egd with Dr Terry was Nov 2022 and he needs to follow up with her re this. He is not ready to redo this as says it was painful.   EXAM: CT ABDOMEN PELVIS W AND WO IV CONTRAST   CLINICAL INDICATION: R10.30: Lower abdominal pain, unspecified R10.13: Epigastric pain.   TECHNIQUE: Noncontrast images through the abdomen and pelvis were obtained. Following administration of non-ionic IV contrast, postcontrast images through the abdomen and pelvis were obtained. ESRC.1.7.3 If applicable, point-of-care testing?was approved following departmental protocol.   COMPARISON: MRI abdomen 11/30/2021.   FINDINGS:  Lower Thorax: Left upper lobe 4.5 x 3.8 mm hyperdensity (series 4, image 2), consistent with calcified granuloma.   Liver: Heterogeneous enhancement with underlying changes of chronic liver disease including mildly nodular contour and fissural widening. No suspicious focal lesions.   Gallbladder/Biliary Tree: Normal.   Spleen: Splenomegaly, with spleen measuring 16.5 cm.   Pancreas: Normal.   Adrenal Glands: Normal.   Kidneys/Ureters:  Symmetrically enhancing. No hydronephrosis. Multiple bilateral subcentimeter hypoattenuating lesions too small to further characterize.   Gastrointestinal: The appendix is not definitively seen. No bowel obstruction. Asymmetric right pericolic edema compared to the left, which is likely related to chronic liver disease/portal colopathy, however colitis is not excluded.   Bladder: Normal.   Prostate/Seminal Vesicles: Normal.   Lymph Nodes: No pathological lymphadenopathy.   Vessels:  Filling defect within the portal vein and distal superior mesenteric vein, possibly involving branches of the SMV and extending into the left portal vein. Small esophageal and perigastric varices. Minimal calcifications of the abdominal aorta, bilateral renal and internal iliac arteries.   Peritoneum/Retroperitoneum: Normal.   Bones/Soft Tissues: Tiny fat-containing umbilical hernia.   IMPRESSION: 1.  Portal vein and superior mesenteric vein filling defect, which is highly suspicious for thrombus. 2.  Asymmetric right pericolic edema compared to the left, which is likely related to chronic liver disease/portal colopathy. Colitis less likely. 3.  Morphologic changes of chronic liver disease. No suspicious focal hepatic lesions.   4.  Sequelae of portal hypertension, including splenomegaly and small upper abdominal varices.    The images were reviewed and interpreted by Reid Boykin MD.  Nov 1 db 0.21, IB 0.69. Wbc 5.1 hg 16.4 platelets 80 and mcv 92.7. Na 140 and k 4.4 an cl 103 and co2 29 and ca 9.4 and bun 17 and cr 0.7 and glu 151 and tp 7.7 and alb 4.1 and ast 36 and alt 35 and tb 0.9 and HIV 93. RPR neg. CD4 147 down from 192.  He was found to have the clot as few months ago said may have overstressed shoulder and had fall earlier and this was found some nonspecific pain in aug.  [place] [facility] [place] labs sent in: na 140 and k 4.8 and cl 101 and co2 29 and glu 203 elevated and please discuss with local provider.  TB 0.7 and alb 4.2 and ca 9.2. cr 0.72. Ast 37 and alt 38 (ideal alt less than 35). Alk 86.   Wbc 3.6 little low and hct 46.3 and plat 72 low. Prior other labs: cmp showed glucose 117 also up. Ast 44 and alt 41 so little lower now. Cbc not run and inr 1.1. Meld using the labs was 7 and meld na 7 so remains low.  Pt says he has high sugar and being watched.  June 2022 labs show that the CBC was clumped and so not able to be run.  June 6 ultrasound shows liver to be mildly coarsened with regards to the echotexture but with no lesions. Bile duct showed no dilated bile ducts and common duct was 3 mm. Gallbladder was normal. Pancreas was obscured by overlying structures. Right kidney 12.3 cm and left kidney 13.6 cm.  No hydronephrosis. Spleen enlarged at 16.7 cm. Liver vessels patent. Prior scan was an MRI from November 30 of last year that showed that the liver had mild morphologic changes of chronic liver disease with fissural widening and minimal nodular contour and no suspicious lesions.  Spleen was enlarged and also at 16 cm.  Small esophageal and perigastric varices were seen.   March 28, 2022 labs show IgM elevated slightly up to 247 with normal from 20 up to 172.  INR 1.1.  White blood cell count 4.4 hemoglobin 15.7 plate count 79 MCV 94 neutrophils 2.8 lymphocytes 0.9 glucose 91 BUN is 16 creatinine 0.73 sodium 138 potassium 4.5 albumin 4.3 bilirubin 0.7 alkaline phosphatase 83 AST 46 and ALT 48. Meld 7  and meld na 7.  He did the covid 19 booster last year he thinks 10-7-21 and has not done the 2nd booster.  January 17, 2022 labs show white blood cell count 4.9 hemoglobin 16.5 hematocrit 45.5 platelet count low at 72.  MCV normal at 92.  Neutrophils 2.9 lymphocytes 1.2.  Pt normally bp 127/90 range. He says not on meds for bp at this time. Maybe see primary to start a low dose beta blocker like carvedilol. That was recommended by Dr Terry as blood thinner and the varices.  Nov 17 2022 egd 3 columns grade 2 ev and no bleeding and incompletely eradicated with banding. SHe recomended egd in 2m.  EGD done by  on April 11, 2022 at [facility] shows 2 columns of grade 1 varices and one Grade 2 varices in the lower third of the esophagus.  Single 7 mm sessile polyp with no bleeding and no stigmata of recent bleeding seen at the cardia. It was removed with hot snare.  It was retrieved.  Two  6 to 10 mm sessile polyps with no bleeding seen on the lesser curvature of the stomach and removed with hot snare.  Mild inflammation characterized by the erythema and granularity seen of the gastric antrum duodenum normal.  Pathology of this came back as gastric polyps x3 with cauterized gastric mucosa with regenerative epithelial changes and cystic dilation with suggestive hyperplastic polyps.    January 25, 2022 EGD showed 2 columns grade 1 varices in 1: Grade 2 varices and diffuse moderate inflammation characterized by congestion erythema granularity of the gastric antrum.  Multiple 2 to 10 mm polyp seen with no bleeding.  January 25, 2022 colonoscopy showed entire colon was normal with nonbleeding rectal varices.  A single subcentimeter condyloma was seen.  Internal hemorrhoids not bleeding was seen at the endoscopy.  There were small in nature.  Patient was to be referred to colorectal surgeon.  He did see april 19 the rectal surgeon and they did a treatment for a condyloma.  November 30, 2021 MRI showed at [place] lower thorax normal.  Liver with no fat or iron but with mild morphologic changes chronic liver disease with fissural widening and minimal nodular contour and no suspicious lesions.  Splenomegaly to 16 cm seen.  Gallbladder/biliary tree normal.  Pancreas normal.  Kidneys normal.  Liver vessels patent.  Small esophageal and perigastric varices seen.    PT Jan 2022 seen by Ms Jessenia LAURENT for TH.  he is on mvi gummies which has 2000% biotin in it. would recommend he avoid. Also on mobic for a year or so, recommend he avoid this as well.   labs 1/3/22: na 138 alt 41 ast 42 alp 77tb 0.7 wbc 4.5 hgb 16.2 plt low 75      RECAP: He has a hx of HIV diagnosed in 1986- treatment initiated in 1996 and follows with Jeffery Hawk PA-C. and Dr Matthew.  Currently on Biktarvy since 01/2020.  Prezcobix was recently discontinued about 2 months ago as concern this was contributing to thrombocytopenia.   He has been seeing hematology (Dr. Yvette Hughes) for Thrombocytopenia: ranges from 139K in 1/28/2013 to 114K in 1/2018 when stabilized and dropped to the 90's in February 2020. Concern of drug related thrombocytopenia with Biktarvy and Prezcobix. CD4 is below 200 and so on Atovaquone (Mepron) for PCP prophylaxis.    MRI 12/2/2021 Liver: No fat or iron. Mild morphologic changes of chronic liver disease with fissural widening and minimal nodular contour. No suspicious lesions.   Patent portal vein, with sequela of portal hypertension, including splenomegaly and small upper abdominal varices.  No personal hx of fatty liver, hepatitis, or other liver disease.  No family hx of liver disease.  Records indicate hep c and hep b negative 2014.  Hep B core+ as of 11/2021 but otherwise hepatitis work up negative.  LFTs in [place] reviewed from 11/2021 and WNL (alt 39, alp 76, ast 37) as are his Fe+ studies. Plt 85 11/15/21.   INR 1.08 10/2021.    Hx of hld currently on pravastatin 2017.   No hx of DM and last a1c 5.4%.  Weight has been fairly stable at about 170-175 over past 6 years, was up to max of 210 in late 1990s.  Summer 2022 175 and Winter 2022 to 170.  No alcohol use.    Marijuana use daily.  Some reports of this causing inc lfts and inc fibrosis. Still doing that.  Denies other drug or tobacco use.  Denies use of otc herbals/supplements.  He has a hx of osteoporosis with last DEXA November 2021 improved, s/p 3 Reclast infusions, follows with Dr. Namita Long endocrinology. He says not seen then in a while and he will check in.  Seem by Viry Serrano PA-C, 12/8/21 for fecal occult +. 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 Denies melena, hematochezia, N/V. Notes abdominal gurgling for years with ARVs without triggers. No anorexia or weight loss. Denies GERD and dysphagia.  Prior colon 10y ago Dr Isidoro CEBALLOSL aside from anal condyloma. No prior EGD.    Plan: 1.  Pt and I reviewed the tests so far. 2.  Pt wants to know if he can get the tips done. 3.  Pt will look to get the egd done and meet up with Dr Thakkar. 4.   He will check hep a vaccine. 5.   RTC in 2months.    Duration of the visit was 73  minutes with 40 minutes of epic chart review and prep and loading all the new tests and results and notes and labs and info from epic to [facility] prep and 33 minutes by healow video for this TeleMed visit with time reviewing their prior and recent records and labs and discussing their current status and future plans for care for the patient. Reid was present also.

## 2024-09-09 ENCOUNTER — OFFICE VISIT (OUTPATIENT)
Dept: URBAN - METROPOLITAN AREA CLINIC 92 | Facility: CLINIC | Age: 64
End: 2024-09-09

## 2024-09-29 ENCOUNTER — TELEPHONE ENCOUNTER (OUTPATIENT)
Dept: URBAN - METROPOLITAN AREA CLINIC 86 | Facility: CLINIC | Age: 64
End: 2024-09-29

## 2024-09-29 NOTE — HPI-TODAY'S VISIT:
[place] notes reviewed showed: September 23 that the patient had an ultrasound-guided paracentesis of 8.2 L. September 9 patient had an ultrasound-guided paracentesis as well.  6.9 L. August 26 6.4 L. August 12 5.6 L July 29 6.8 L. July 15 7.4 L TIPS was placed on June 24 with the patient having a successful post TIPS sinusoidal pressure of 11 and 10 mmHg.. Patient was seen by ID on August 21 and continues to have a paracentesis every 2 weeks and is on diuretics and low-sodium diet. 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  Superior mesenteric and portal vein thrombosis history noted. Last EGD 2022 with grade 2 varices status post banding. Remains on Biktarvy treatment.  Was on Aldactone 100 mg twice a day and furosemide 40 mg twice a day. Note from August 20, 2024 from transplant surgery patient is an acceptable candidate for transplant.  Dr. Elaine saw. August 12 hematology saw and patient felt to have need for continued Eliquis 5 mg twice a day for the portal and mesenteric venous thrombosis history. June 24 path: The liver biopsy shows portal tracts with variable absence, narrowing or obliteration of the portal veins. Some of the hepatic arterioles appear thickened. Except for rare portal tracts with mild lymphocytic inflammation and one with periductal inflammation and biliary epithelial damage, there is no portal inflammation and  intrahepatic bile ducts are preserved. Mild ductular reaction is noted. The lobules show perivenular dilatation and irregular contours of the sinusoids. Some of the central veins are dilated. Distortion of the parenchymal architecture is evidenced by irregularly distributed bridging fibrous septa, as well as approximation of portal tracts and central veins (immunohistochemical stain for glutamine synthetase). The abnormal architecture is further highlighted by the reticulin stain which shows irregularly distributed 2 cell thick plates and areas of reticulin collapse.Trichrome stain highlights expanded portal tracts with focal bridging fibrosis. Iron and PASD stains are negative. CK-7 highlights bile ductular proliferation.  History is noted of cirrhosis (diagnosed on imaging), HIV (viral load now undetectable), portal vein thrombosis, portal hypertension, splenomegaly and ascites.  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 This might be idiopathic or related to drug/medication-associated endothelial/ vascular damage Sees Dr. Thakkar on October 28 in the transplant clinic.  Also has an MRI Oct 28th.

## 2024-10-03 ENCOUNTER — OFFICE VISIT (OUTPATIENT)
Dept: URBAN - METROPOLITAN AREA TELEHEALTH 2 | Facility: TELEHEALTH | Age: 64
End: 2024-10-03

## 2024-10-03 NOTE — HPI-TODAY'S VISIT:
This is a 63 year old male referred  by Viry Serrano PA-C and last seen May 2-24 for an evaluation of abnormal MRI with suspected cirrhosis/portal htn and new clot seen on scan and ascites needing frequent taps and undergoing eval at [facility] with Dr Thakkar.  A copy of this note will be sent to the referring provider.  Dr Thakkar is to see Oct 28  [facility] notes reviewed showed: September 23 that the patient had an ultrasound-guided paracentesis of 8.2 L. September 9 patient had an ultrasound-guided paracentesis as well.  6.9 L. August 26 6.4 L. August 12 5.6 L July 29 6.8 L. July 15 7.4 L TIPS was placed on June 24 with the patient having a successful post TIPS sinusoidal pressure of 11 and 10 mmHg.. Patient was seen by ID on August 21 and continues to have a paracentesis every 2 weeks and is on diuretics and low-sodium diet. 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  Superior mesenteric and portal vein thrombosis history noted. Last EGD 2022 with grade 2 varices status post banding. Remains on Biktarvy treatment.  Was on Aldactone 100 mg twice a day and furosemide 40 mg twice a day. Note from August 20, 2024 from transplant surgery patient is an acceptable candidate for transplant.  Dr. Elaine saw. August 12 hematology saw and patient felt to have need for continued Eliquis 5 mg twice a day for the portal and mesenteric venous thrombosis history. June 24 path: The liver biopsy shows portal tracts with variable absence, narrowing or obliteration of the portal veins. Some of the hepatic arterioles appear thickened. Except for rare portal tracts with mild lymphocytic inflammation and one with periductal inflammation and biliary epithelial damage, there is no portal inflammation and  intrahepatic bile ducts are preserved. Mild ductular reaction is noted. The lobules show perivenular dilatation and irregular contours of the sinusoids. Some of the central veins are dilated. Distortion of the parenchymal architecture is evidenced by irregularly distributed bridging fibrous septa, as well as approximation of portal tracts and central veins (immunohistochemical stain for glutamine synthetase). The abnormal architecture is further highlighted by the reticulin stain which shows irregularly distributed 2 cell thick plates and areas of reticulin collapse.Trichrome stain highlights expanded portal tracts with focal bridging fibrosis. Iron and PASD stains are negative. CK-7 highlights bile ductular proliferation.  History is noted of cirrhosis (diagnosed on imaging), HIV (viral load now undetectable), portal vein thrombosis, portal hypertension, splenomegaly and ascites.  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 This might be idiopathic or related to drug/medication-associated endothelial/ vascular damage Sees Dr. Thakkar on October 28 in the transplant clinic.  Also has an MRI Oct 28th.  [facility] review: June 26 2024 PT  17.2 and inr 1.5 and na 132 and k 4.7 and cl 96 and co2 28 and ca 8.9 and bun 18 and cr 0.67 and glucose 120.  Total protein 6.9 and alb 3.0 and alk 195 and ast 43 and alt 34 and tb 1.1 and wbc 5.8 and hg 15.9 and hct 47.4 and mcv 92.2 and platelet 139. Neutrophils 3.75 and lymphs 1.05 and monocytes 0.72 little up. afp 4.5. Meld 11 and meld na 11 and meld 3.0 15.  All the labs showed smooth muscle antibody positive at 320 AMA negative at 6.8 alpha-1 M1M1 with a level 229.  aFP 4.6.  Gamma GTP 71.  Hemoglobin A1c 6.1.  TSH 3.6.  PETH level negative.  PSA 1.15.  F-actin low positive at 38.  6 ferritin 178.  INR 1.8.  Prior CMV.  Prior Piyush-Barr.  No hep A immunity so needs hep A vaccine and he will check Hep B core positive and hep B surface antibody 261 surface antigen negative.  Antibody negative.  QuantiFERON test negative.  RPR was negative.  Urine screen was positive for marijuana metabolites.  Blood type B+.  June 24 liver bx: A. 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 Patchy bridging fibrosis and marked parenchymal architectural distortion. See comment. The liver biopsy shows portal tracts with variable absence, narrowing or obliteration of the portal veins. Some of the hepatic arterioles appear thickened. Except for rare portal tracts with mild lymphocytic inflammation and one with periductal inflammation and biliary epithelial damage, there is no portal inflammation and  intrahepatic bile ducts are preserved. Mild ductular reaction is noted. The lobules show perivenular dilatation and irregular contours of the sinusoids. Some of the central veins are dilated. Distortion of the parenchymal architecture is evidenced by irregularly distributed bridging fibrous septa, as well as approximation of portal tracts and central veins (immunohistochemical stain for glutamine synthetase). The abnormal architecture is further highlighted by the reticulin stain which shows irregularly distributed 2 cell thick plates and areas of reticulin collapse.Trichrome stain highlights expanded portal tracts with focal bridging fibrosis. Iron and PASD stains are negative. CK-7 highlights bile ductular proliferation.  History is noted of cirrhosis (diagnosed on imaging), HIV (viral load now undetectable), portal vein thrombosis, portal hypertension, splenomegaly and ascites.  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 This might be idiopathic or related to drug/medication-associated endothelial/ vascular damage.  DDI was on the past and so raises that a question for this.  Tap 7- 7.4 liters. July 1 6.7 liters. June 17 8.0 liters June 1 7.6 liters May 15 5 liters.  Need to see what the plan from Dr Thakkar re TIPS option.  Mri 1: EXAM: MR ABDOMEN W AND WO CONTRAST  CLINICAL INDICATION: cirrhosis. As per medical chart review (Colon & Rectal Surgery note 3/12/2024): Cirrhosis (2/2 to [facility]), PV thrombus on Eliquis, HIV+(CD4 210 Vl 25 12/23). Anal canal condyloma left anterior, s/p excision of anal canal condyloma (anterior, left lateral) on 4/19/22.  TECHNIQUE: Multisequence, multiplanar MRI of the abdomen was performed without and with intravenous contrast. ESRC.2.7.3  COMPARISON: 5/1/2024, 7/6/2023 and 11/29/2022.  FINDINGS: Lower Thorax: Bibasilar atelectasis.  Liver: No fat or iron. Cirrhotic morphology of the liver with nodular contour, lobar redistribution and fissural widening. No suspicious focal lesion.  Gallbladder/Biliary Tree: Normal.  Spleen: Splenomegaly, measuring 17.0 cm in the craniocaudal axis.  Pancreas: Normal.  Adrenal Glands: Normal.  Kidneys/Ureters: Subcentimeter bilateral renal cysts. No hydronephrosis.  Gastrointestinal: Normal.  Lymph Nodes: Normal.  Vessels: Residual nonocclusive thrombus in the left portal vein, main portal vein, and portomesenteric confluence, slightly improved when compared to prior dated 11/9/2023. Similar upper abdominal perigastric, perisplenic and kaylynn hepatis varices.  Peritoneum/Retroperitoneum: Large volume of ascites.  Bones/Soft Tissues: No aggressive osseous lesion.  IMPRESSION: 1.  No suspicious hepatic lesion. 2.  Morphologic changes of chronic liver disease with sequelae of portal hypertension including worsening splenomegaly, large volume of ascites and upper abdominal varices. 3.  Redemonstration of nonocclusive thrombus within the left portal vein, main portal vein and portomesenteric confluence, slightly improved when compared to prior dated 11/9/2023.  The images were reviewed and interpreted by Mariya Farmer MD  Egd and colon not done yet scheduled to Dr Chester? Says was done in 2022.  May 28 2024:  Dr Thakkar Note: Assessment and Plan Seth Wilkerson is a very pleasant 63 y.o. male who is referred for further evaluation and management of decompensated cirrhosis with ascites.  1. Cirrhosis Unclear etiology of his liver disease though suspect may be secondary to antiretroviral therapy.  His MELD score is 15 and his disease is decompensated with new onset ascites recently requiring large-volume paracentesis, and portal vein thrombosis.  Antiretroviral therapy can lead to a hepatitis, steatosis, or steatohepatitis, leading to liver fibrosis.  He does also have some other risk factors for metabolic associated steatotic liver disease.  No mention of any significant steatosis on imaging however.  You can also see noncirrhotic portal hypertension in patients on antiretroviral therapy.  Given the possible implications including need for liver transplantation, I would recommend he complete a transjugular liver biopsy with pressure measurements to determine degree of fibrosis and whether or not he has noncirrhotic portal hypertension vs cirrhosis.  2. Ascites/volume overload Titrate up lasix to 40mg and aldactone to 100mg daily and repeat labs in 1-2 weeks. Low sodium diet (<2 g) and <2L of fluid daily. LVP PRN ordered  3. Hepatic encephalopathy No issues  4. Variceal screening/portal hypertension History of grade II EV with banding x 3 in 11/2022. Due. Spoke with Dr. Dominguez, he requests we complete EGD here. Will order.  5. Hepatocellular carcinoma screening Due for MRI in 11/2024. Will order.  6. Transplant candidacy Will plan the above and then determine if we need to complete a full transplant evaluation.  Thank you for allowing me to participate in the care of your patient. If you have any questions or concerns please do not hesitate to contact me or my office. John Thakkar MD Transplant Hepatology  Prior labs:  May 21 labs show sodium 135 which is a little lower than 139 last week. Not uncommon to see this go a little lower due to the water pills, but this is still still in the acceptable range for someone on diuretics for ascites like you.   Potassium 4.8 which is normal but a little higher. Chloride 99 CO2 30 calcium 9.1 BUN of 18 creatinine 0.66 so that remains stable for you. Glucose was a little bit up at 112 and unclear if you were fasting. Labs on? Total protein 7.1 albumin remains about the same at 3.1 which is a little low alk phos 144 AST 40 ALT 25 and these are about the same. Bilirubin lower at 1.0 which is good to see.    No acute issues seen with these labs to suggest any need for changes and from the notes that you have mention so far, it appears that your weight is staying lower and possibly dropping somewhat.    Need to follow the trend.    Pending how you do with your weight that we can talk about next steps and options.  Dr. Alberto Wilkerson, May 15 cytology came back negative for any malignant cells. This is what we expected but just wanted to confirm this. Fluid studies also are negative for growth to date which is also good to confirm. We responded to your message to the portal so we saw today. Dr. Alberto Wilkerson,   More fluid studies back from May 15 showing the Gram stain with rare WBCs and no organisms seen on Gram stain which is good to know. That means that there is no clear signs of any bacteria seen on the initial studies of the fluid. The ascites fluid culture so far shows no growth which is also good to confirm and is in line with what we saw with the low neutrophil count in the fluid. Still waiting any additional studies. Please let us know how you are doing with regards to your weight as our hope is that your weight will remain stable with your diuretics. Dr. Alberto Wilkerson, Received several reports today and one of them was the final official ultrasound report that showed that they removed 5 L of fluid. Typically they will replace albumin if you have more than 5 L removed and so that may be why you did not receive the IV albumin. The final fluid counts came back with a nucleated cell count again at 148 which is well below the 250 cut off.Also the neutrophils were 40% and lymphocytes 10% and macrophages 50% so clearly not infected. The albumin level was low at less than 1.5 suggesting again that fluid is more likely from a liver source.The total protein also was low at less than 3 suggesting a liver source more likely for the fluid as well. I hope that the tap helps get  you the relief that you need and that the diuretics can control and keep the weight from reaccumulating.  Please let us know. Dear Mr Seth Rock, Ascites fluid study sent to us from today May 15. 148 nucleated cells were seen and typically if the cell count is below 250 cells, there is no infection suspected in the fluid, so that would be consistent with that.  We still await the actual differential of these cells. We also await other tests to guide as to source if possible. We have not seen the actual report either of the procedure as it is still preliminary phase and not released. So not sure how much removed. Hope you are feeling better.  Please track your weights now post the tap and see if the weight remains stable but at a lower level. Dr Alberto Wilkerson, We saw your labs from today shortly after that initial message.   Sodium was 139 potassium 4.5 BUN 15 creatinine 0.69 glucose 107 which is elevated. Your labs are felt stable versus prior when pre diuretic labs showed a BUN of 18 creatinine 0.59 and a glucose of 122.  The potassium did go up slightly but still normal from 3.8 to the 4.5. So no issues tolerating the current dose of diuretics seen.   Albumin remains slightly low at 3.1 and lower than 3.2 before. Liver normal at 3.5.  Alk phos was normal at 104, AST was slightly higher at 42 ALT of 23 and bilirubin 1.3. Again not very different from your baseline.  We await the  paracentesis tomorrow with fluid studies and hopefully youwill feel better with that.   We updated Dr Matthew re the treatment plan and they are  aware of it also.    Dr Alberto Wilkerson,  Labs from your comprehensive metabolic profile today, May 7, back today.  Sodium 139 potassium 3.8 chloride 102 CO2 27 calcium 8.8 BUN of 18 creatinine 0.59 glucose low at 122 from previous 194 about 3 months ago. Albumin was a little low at 3.2. It had been normal before. Alk phos was slightly up at 111 AST 38 ALT 22 so not significantly changed and in fact a little lower than 3 months ago. Bilirubin was slightly up at 1.2 from 1.1.  These laboratories are stable for you to take the furosemide 20 mg once a day and the spironolactone 50 mg once a day that we called in for you and lets see how you do.  I would recommend that you repeat these labs again in about a week to see how you are doing on that regimen.  I would also ask you to follow your weights to see what that trend shows.  Thank you.  Dr. Dominguez Dear Seth Wilkerson,   May 3 labs: were looked at late yesterday and discussed with Dr Matthew in the afternoon but we both noted that CMP was not back and initially appeared to be pending to be resulted.  Checked today and remains not back and not showing as pending now.  A new CMP order put in by Dr Matthew Friday appears to be there as a future order to activate with your next lab draw.  This is an issue as needed cmp to check your renal function as a potential cause of fluid issues and to guide as baseline renal status for diuretics which could affect this and to compare to. Also cmp values are needed to calculate liver status predictive model scores.   PT was 17.5 and inr 1.5 which are a little up. These were normal when last done. Ammonia level 29 normal which was good to see.PSA 1.9 normal. HIV load is pending. CD4 was 161. A1c 5.8% little up.   Although not ideal, given your ascites issues, we could potentially start low dose lasix and aldactone today and have you take through Monday and then wait to see what labs look like on Monday. Needed to confirm this issue with you.  Tried to call you x 2 and could not get you.  So sending info via portal message. Please let us know and where to send these prescriptions to also.  If ascites feels like it is getting worse, please go to ER to get seen and they can potentially help arrange tap and get needed labs and start tx immediately in the interim.  Thank you.   Dr Dominguez  May 1 mri: Narrative & ImpressionEXAM: MR ABDOMEN W AND WO CONTRAST CLINICAL INDICATION: cirrhosis.As per medical chart review (Colon & Rectal Surgery note 3/12/2024): Cirrhosis (2/2 to [facility]), PV thrombus on Eliquis, HIV+(CD4 210 Vl 25 12/23). Anal canal condyloma left anterior, s/p excisionof anal canal condyloma (anterior, left lateral) on 4/19/22. TECHNIQUE: Multisequence, multiplanar MRI of the abdomen was performed without and with intravenous contrast. ESRC.2.7.3 COMPARISON: 5/1/2024, 7/6/2023 and 11/29/2022. FINDINGS:Lower Thorax: Bibasilar atelectasis. Liver: No fat or iron. Cirrhotic morphology of the liver with nodular contour, lobar redistribution and fissural widening. No suspicious focal lesion. Gallbladder/Biliary Tree: Normal. Spleen: Splenomegaly, measuring 17.0 cm in the craniocaudal axis. Pancreas: Normal. Adrenal Glands: Normal. Kidneys/Ureters: Subcentimeter bilateral renal cysts. No hydronephrosis. Gastrointestinal: Normal. Lymph Nodes: Normal. Vessels: Residual nonocclusive thrombus in the left portal vein, main portal vein, and portomesenteric confluence, slightly improved when compared to prior dated 11/9/2023. Similar upper abdominalperigastric, perisplenic and kaylynn hepatis varices. Peritoneum/Retroperitoneum: Large volume of ascites. Bones/Soft Tissues: No aggressive osseous lesion. IMPRESSION:1.  No suspicious hepatic lesion.2.  Morphologic changes of chronic liver disease with sequelae of portal hypertension including worsening splenomegaly, large volume of ascites and upper abdominal varices.3.  Redemonstration of nonocclusive thrombus within the left portal vein, main portal vein and portomesenteric confluence, slightly improved when compared to prior dated 11/9/2023.\~The images were reviewed and interpreted by Mariya Farmer MD.  He has ascites: he has never had a paracentesis.  No recent labs. Need them to consider getting labs to consider tap.  He needs to consider diuretics.  He says she was concerned re brain fog,    January 2024 ferritin level normal at 89 and iron sat normal at 34% sodium 138 potassium 4.3 chloride 105 CO2 26 BUN of 9 creatinine 0.64 glucose elevated at 194 albumin 3.5 alk phos slightly up at 109 coming down from 110 before.  AST slightly up at 42 and ALT of 38 previously AST 35 and ALT 29 back in December.  Bilirubin 1.1 down from 1.2 in December.  WBC 4.4 hemoglobin normal again at 14.6 hematocrit 41.5 platelet count was 80.  Back in December hemoglobin slightly up at 16.2 and platelet count 89.  December 2023 labs showed A1c of 7.1 up from 5.4 in October 2021.  Vitamin D level was 34.  B12 was 570 TSH was 3.04 cholesterol 148 triglycerides 84 HDL 40 LDL 91 WBC 5.3 hemoglobin 16.2 plate count 89 sodium 138 potassium 4.9 urine 16 creatinine 0.79 glucose 174 albumin 3.7 alk phos 110 AST 35 ALT 29 HIV load was positive at 25 CD4 count was 210 up from 161 in May.  April 2024 visit with Dr. Matthew mention that his CD4 count may be depressed due to hypersplenism his viral load was 25 and there is hep B immune from previous infection and was a nonresponder to hep A vaccine.  She has osteopenia now from previous osteoporosis and bone density 2021 normal.  She coming on his diabetes being noted.Seen by Dr. Solano on January 2024 for concern for irregular heartbeat in electrocardiogram showed normal sinus rhythm and normal conduction.  There were no plan to check 48-hour Holter monitor per notes from indication noted.Appears to have possibly stress test for April 29. Pending that for clearance to see Dr Terry.  Offered a therapeutic paracentesis.  November 9 2023 MRI ordered by Brady Hawk sent in.Lower thorax normal. Liver with no fat or iron but with morphologic changes of chronic liver disease seen including nodular surface contour, lobar redistribution and fissural widening.  No new suspicious lesions. Gallbladder/biliary tree normal. Spleen mildly enlarged measuring up to 15.8 cm. Pancreas normal. Adrenal glands normal. Kidneys showing subcentimeter bilateral renal cysts but no hydronephrosis. Gastrointestinal views normal. Lymph nodes normal. They mention extensive motion artifact on postcontrast imaging and this limited views but they suggested probable residual nonocclusive thrombus in the left central portal vein similar to July 6, 2023 and additional possible minimal nonocclusive filling defects within the main portal vein cannot be excluded on this imaging.  Patent SMV.  Similar upper abdominal perigastric and perisplenic kaylynn hepatis varices. He does not recall any issues. Trace free fluid seen in the abdomen. No aggressive osseous lesions. In summary, evaluation residual portal vein thrombosis was significantly suboptimal secondary to extensive motion artifact on the postcontrast imaging.  There is a suggestion of probable residual nonocclusive thrombus within the left portal vein as seen in July 6 and additional minimally occlusive filling defects in the main portal vein cannot be excluded recommend continued attention on short-term follow-up. Morphologic changes of chronic liver disease with sequela of portal hypertension and mild splenomegaly and trace ascites in upper abdominal varices seen.  Asked if he was on the low salt diet and he has gone on a low salt diet and is looking at salt but he does not recall a particular episode.  He noted swelling 2 weeks ago.   November 2, 2023.  Debated on the date that the patient saw Dr. Terry to be scheduled for an EGD with variceal banding but patient needed to see cardiology prior to the EGD and to hold the Eliquis for 3 days before. Jan 18 2024 appt dr Zackary Solano. He is to see him as she thought of irregular heart beat. Has not been done yet.  He is pending some labs to be done for Dr Menjivar and to do this. Dr Matthew in Jan 2024.   July 24 labs show ferritin slightly higher at 100 from January when it was 78 and in June of last year was 42.  Iron saturation was 34% and back in May had been 42% and in January it had been 32%. Sodium 138 potassium 4.4 chloride 104 CO2 25 calcium 8.8 BUN of 14 creatinine 0.7 glucose was up at 183 albumin 3.8 total protein 7.8 alkaline phosphatase slightly up at 112 and had been previously normal at 88.  AST of 32 and ALT of 39 and previously AST 42 and ALT 46.  WBC 4.6 hemoglobin 15.6 platelet count 75 MCV 90.5. Asked if any recent issues to explain alk phos and he denied any issue of new meds. May 2023 HIV level was 40 and CD4 count was 161 and was previously 147.  Dr Hughes is following 2 x a year.  July 25 saw Dr Hughes: summary she cut back eliquis dose to half. Iron deficiency anemia     Normal ferritin, off iron supplements        Thrombocytopenia  : Stable platelet count, asymptomatic  Patient can continue on anticoagulation as long as platelet count is above 50,000 and no bleeding Next follow up appointment in 6 months.  Portal and mesenteric vein thrombosis:  Elevated Ddimer as per today labs Despite Ddimer is elevated, I suggested decreasing apixaban to 2.5 mg BID. Although patient believes that fatigue is related to Apixaban, I suspect that is related to propranolol. He will discuss with Dr. Dominguez (hepatologist)  He says he told her that he does not like blood thinners.  Re the beta blocker, option if this is not to be used is to do banding instead and he has concerns with doing the banding and that limits options. Beta blocker helps lower the risk for bleeding.  He does not want to do the banding again due to pain he had from that. He lost weight.  He could also do a diagnostic egd if the improvement of the clot has also led to improvement. That would be another option to discuss with Dr Terry.  July 6 mri: EXAM: MR ABDOMEN W AND WO CONTRAST   CLINICAL INDICATION: follow up SMV/PV thrombosis on previous hepatic imaging before hematology appt in July.   TECHNIQUE: Multisequence, multiplanar MRI of the abdomen was performed without and with intravenous contrast. ESRC.2.7.3   COMPARISON: MRI abdomen 11/29/2022, and 3/13/2023   FINDINGS:  Lower Thorax: Normal.   Liver: No fat or iron. Unchanged morphologic changes of chronic liver disease without suspicious lesion.    Gallbladder/Biliary Tree: Normal.   Spleen: Splenomegaly measuring 16.6 cm in CC dimension, previously 16.3 cm in cc dimension 3/13/2023.   Pancreas: Normal.   Adrenal Glands: Normal.    Kidneys/Ureters: Unchanged bilateral simple renal cysts. No hydronephrosis.   Gastrointestinal: Normal.    Lymph Nodes: Normal.   Vessels: Residual peripheral nonocclusive thrombus noted in the distal portal vein and left hepatic vein (14:44 Stable varices.   Peritoneum/Retroperitoneum: Normal.   Bones/Soft Tissues: Degenerative changes of the spine. No aggressive osseous lesions.   IMPRESSION:   1. Nonocclusive residual peripheral thrombus in the main portal vein and left hepatic vein, with significant decrease of clot burden compared to March 2023. 2. Morphologic changes of chronic liver disease with stigmata of portal hypertension, without suspicious hepatic lesion.   The images were reviewed and interpreted by Mariya Farmer MD.  He may want to plan to do the mri in Nov which will be 4m later again and reassess how the residual clot is doing. If the clot is continuing to be better that may be doing better.  March 2023 mri: EXAM: MR ABDOMEN W AND WO CONTRAST   CLINICAL INDICATION: cirrhosis.   TECHNIQUE: Multisequence, multiplanar MRI of the abdomen was performed without and with intravenous contrast. ESRC.2.7.3   COMPARISON: 11/29/2022   FINDINGS:  Lower Thorax: Clear lung base.   Liver: Morphologic changes of chronic liver disease. No suspicious hepatic lesion.   Gallbladder/Biliary Tree: Unremarkable gallbladder. No intra or extrahepatic biliary ductal dilatation.   Spleen: Spleen is enlarged measuring up to 16 cm in the craniocaudal dimension.   Pancreas: No main ductal dilatation.   Adrenal Glands: No adrenal nodules.   Kidneys/Ureters: No hydronephrosis. Bilateral renal cysts.   Gastrointestinal: Nonobstructive bowel loops.    Lymph Nodes: No suspicious lymph nodes by size criteria.   Vessels: Since 11/29/2022, interval significant decrease in known SMV and portal vein thrombosis, with minimal residual filling defects in the portal vein and no filling defects in the SMV. Bilateral portal veins are patent. Patent splenic vein. Upper abdominal varices.   Peritoneum/Retroperitoneum: No free air or free fluid.   Bones/Soft Tissues: Multilevel degenerative changes in the spine. No suspicious osseous lesion. Unremarkable soft tissues.     IMPRESSION: 1.  Hepatic cirrhosis with manifestations of portal hypertension, without suspicious focal hepatic lesion. 2.  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 Patent splenic veins.   Komal pulled the labs and imaging for your visit from [place] for us: January 23 recent labs show white blood cell count 4.0 which is slightly low hemoglobin elevated at 16.6 hematocrit elevated at 48.2 (sees heme) and platelet count diminished at 73.  Previously your platelet count had been 85 and hemoglobin had been 15.5 in December.  WBC had been 4.6 back in December as well. Neutrophils were 2.27 lymphocytes 1.03 which were normal. Sodium was 140 potassium 5.0 chloride 105 CO2 of 28 BUN 13 creatinine 0.75 glucose was 165.  Albumin was 4.1.  Alkaline phosphatase 95 AST was 36 ALT 32 total bilirubin 0.8.  Previously your AST was 36 ALT of 35 so these appear to be quite stable. Your iron saturation was normal at 32% and ferritin was normal at 78.  NEW MRI:  Your recent March 13 MRI showed lower thorax had clear lung bases.   Liver had morphologic changes of chronic liver disease with no suspicious liver lesions. Unremarkable gallbladder was seen with no intra extrahepatic bile duct dilation. Spleen was enlarged at 16 cm. Pancreas showed no main ductal dilation. Adrenal glands showed no adrenal nodules. Kidney showed no hydronephrosis.  Bilateral renal cysts were seen. Lymph nodes showed no suspicious lymph nodes by size criteria. The vessels showed interval decrease significantly in the known SMV and portal vein thrombosis with minimal residual filling defects seen in the portal vein and no filling defects seen in the SMV.  Bilateral portal veins were patent.  Patent splenic vein was seen.  Upper abdominal varices were seen. Multilevel degenerative changes seen in the spine with no suspicious osseous lesions. Overall, they mention you still had signs of cirrhosis with manifestations of portal hypertension without suspicious lesions and a significant interval resolution of the SMV thrombosis with minimal residual filling defect seen in the main portal vein now and patent splenic veins. I would recommend that we repeat the scan again in approximately 4 months to make sure that it continues to show further improvement and complete resolution if possible but overall very happy to see what it reported and please share with local providers.  He was started on propanolol and eliquis x 3 months.  He sees Hematology 2 x a year.  Narrative & Impression EXAM: MR ABDOMEN W AND WO CONTRAST   CLINICAL INDICATION: cirrhosis.   TECHNIQUE: Multisequence, multiplanar MRI of the abdomen was performed without and with intravenous contrast. ESRC.[id number]   COMPARISON: 11/29/2022   FINDINGS:  Lower Thorax: Clear lung base.   Liver: Morphologic changes of chronic liver disease. No suspicious hepatic lesion.   Gallbladder/Biliary Tree: Unremarkable gallbladder. No intra or extrahepatic biliary ductal dilatation.   Spleen: Spleen is enlarged measuring up to 16 cm in the craniocaudal dimension.   Pancreas: No main ductal dilatation.   Adrenal Glands: No adrenal nodules.   Kidneys/Ureters: No hydronephrosis. Bilateral renal cysts.   Gastrointestinal: Nonobstructive bowel loops.    Lymph Nodes: No suspicious lymph nodes by size criteria.   Vessels: Since 11/29/2022, interval significant decrease in known SMV and portal vein thrombosis, with minimal residual filling defects in the portal vein and no filling defects in the SMV. Bilateral portal veins are patent. Patent splenic vein. Upper abdominal varices.   Peritoneum/Retroperitoneum: No free air or free fluid.   Bones/Soft Tissues: Multilevel degenerative changes in the spine. No suspicious osseous lesion. Unremarkable soft tissues.     IMPRESSION: 1.  Hepatic cirrhosis with manifestations of portal hypertension, without suspicious focal hepatic lesion. 2.  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 Patent splenic veins.   Epics notes: He saw Dr. Hughes in hematology on January 23, 2023 with labs showing ferritin 78 iron sat 32% sodium 140 potassium 5.0 chloride 105 CO2 of 28 calcium 9.6 BUN of 13 creatinine 0.75 glucose elevated 165 albumin 4.1 alk phos 95 AST 36 ALT 32 total bilirubin 0.8.  Her note mentions that the patient was recently started on anticoagulation due to the superior mesenteric and portal vein thrombosis and seen and platelet count was noted to be 73,000 at that time.  He is on apixaban 5 mg twice daily and his last MRI was in November 2022 and extremities issues.  They felt that  could continue anticoagulation as long as his platelet count remained above 50,000 and no bleeding was seen.  She was planning to see him again on approximately July 24, 2023  He should get the mri redone in early in JU[place] and so that would be available for when sees hematology.  EXAM: MR ABDOMEN W AND WO CONTRAST  CLINICAL INDICATION: Cirrhosis Of Liver.  TECHNIQUE: Multisequence, multiplanar MRI of the abdomen was performed without and with intravenous contrast. ESRC.[id number]  COMPARISON: 11/30/2021 MRI, 11/12/2022 CT   FINDINGS:  Evaluation is slightly limited by respiratory motion artifact and subsequent misregistration on subtraction imaging.   Lower Thorax: Normal.   Liver: No fat or iron. Morphologic changes of chronic liver disease. Evaluation for arterially enhancing lesions is limited due to early arterial phase of contrast with nonopacification of the portal vein, in part due to known portal venous thrombus. Within these confines: No arterially enhancing lesions. No lesions with washout or capsule.    Gallbladder/Biliary Tree: Normal.   Spleen: Enlarged measuring 16 cm.   Pancreas: Mildly atrophic without ductal dilation. No focal suspicious lesions.   Adrenal Glands: Normal.    Kidneys/Ureters: Renal cysts.   Gastrointestinal: No obstruction.    Lymph Nodes: Normal.   Vessels: Partial opacification of the superior mesenteric vein extending to the main portal vein, seen on prior CT. Near complete opacification of the left portal vein. The right portal vein branches are partially occluded centrally and grossly patent distally. Overall, venous occlusion appears to be secondary to bland thrombus in the main portal and superior mesenteric vein, despite presence of diffuse restricted diffusion. Evaluation for underlying enhancement is limited within the intrahepatic branches due to respiratory motion artifact and misregistration.   Splenic vein is patent. Small upper abdominal, perigastric and paraesophageal varices. Abdominal aorta is normal in course and caliber.   Peritoneum/Retroperitoneum: No significant ascites.   Bones/Soft Tissues: No aggressive osseous lesions.   IMPRESSION: 1. Morphologic changes of chronic liver disease. No definite suspicious liver lesions within the limitations described above. 2. 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 Findings are favored secondary to bland thrombus; however, in this high-risk patient, underlying isolated tumor thrombus is difficult to exclude given presence of restricted diffusion and  extensive respiratory motion artifact and misregistration which limits the use of subtraction imaging. Attention on short-term interval follow-up MRI is recommended. 3. Sequela of portal hypertension, as above.   Critical findings were communicated electronically with and acknowledged by Dr. Zach Menjivar on 11/14/2022 10:49 AM. The following impression point(s) have been communicated: Portal vein and SMV filling defect highly suspicious for thrombus.   The images were reviewed and interpreted by Reid Boykin MD. Addended by Reid Boykin MD on 11/14/2022 12:18 PM  Dr Hughes wanted to put him on blood thinner and we needed to see what Dr Terry said: She mentioned that she would proceed to AC and she wanted on beta blocker as possible. He had been banded x 3 by her recently.  He says he did not speak with her: he reaches her through the call office : [phone]  Last egd with Dr Terry was Nov 2022 and he needs to follow up with her re this. He is not ready to redo this as says it was painful.   EXAM: CT ABDOMEN PELVIS W AND WO IV CONTRAST   CLINICAL INDICATION: R10.30: Lower abdominal pain, unspecified R10.13: Epigastric pain.   TECHNIQUE: Noncontrast images through the abdomen and pelvis were obtained. Following administration of non-ionic IV contrast, postcontrast images through the abdomen and pelvis were obtained. ESRC.1.7.3 If applicable, point-of-care testing?was approved following departmental protocol.   COMPARISON: MRI abdomen 11/30/2021.   FINDINGS:  Lower Thorax: Left upper lobe 4.5 x 3.8 mm hyperdensity (series 4, image 2), consistent with calcified granuloma.   Liver: Heterogeneous enhancement with underlying changes of chronic liver disease including mildly nodular contour and fissural widening. No suspicious focal lesions.   Gallbladder/Biliary Tree: Normal.   Spleen: Splenomegaly, with spleen measuring 16.5 cm.   Pancreas: Normal.   Adrenal Glands: Normal.   Kidneys/Ureters:  Symmetrically enhancing. No hydronephrosis. Multiple bilateral subcentimeter hypoattenuating lesions too small to further characterize.   Gastrointestinal: The appendix is not definitively seen. No bowel obstruction. Asymmetric right pericolic edema compared to the left, which is likely related to chronic liver disease/portal colopathy, however colitis is not excluded.   Bladder: Normal.   Prostate/Seminal Vesicles: Normal.   Lymph Nodes: No pathological lymphadenopathy.   Vessels:  Filling defect within the portal vein and distal superior mesenteric vein, possibly involving branches of the SMV and extending into the left portal vein. Small esophageal and perigastric varices. Minimal calcifications of the abdominal aorta, bilateral renal and internal iliac arteries.   Peritoneum/Retroperitoneum: Normal.   Bones/Soft Tissues: Tiny fat-containing umbilical hernia.   IMPRESSION: 1.  Portal vein and superior mesenteric vein filling defect, which is highly suspicious for thrombus. 2.  Asymmetric right pericolic edema compared to the left, which is likely related to chronic liver disease/portal colopathy. Colitis less likely. 3.  Morphologic changes of chronic liver disease. No suspicious focal hepatic lesions.   4.  Sequelae of portal hypertension, including splenomegaly and small upper abdominal varices.    The images were reviewed and interpreted by Reid Boykin MD.  Nov 1 db 0.21, IB 0.69. Wbc 5.1 hg 16.4 platelets 80 and mcv 92.7. Na 140 and k 4.4 an cl 103 and co2 29 and ca 9.4 and bun 17 and cr 0.7 and glu 151 and tp 7.7 and alb 4.1 and ast 36 and alt 35 and tb 0.9 and HIV 93. RPR neg. CD4 147 down from 192.  He was found to have the clot as few months ago said may have overstressed shoulder and had fall earlier and this was found some nonspecific pain in aug.  Vir[place] 6 [facility] labs sent in: na 140 and k 4.8 and cl 101 and co2 29 and glu 203 elevated and please discuss with local provider.  TB 0.7 and alb 4.2 and ca 9.2. cr 0.72. Ast 37 and alt 38 (ideal alt less than 35). Alk 86.   Wbc 3.6 little low and hct 46.3 and plat 72 low. Prior other labs: cmp showed glucose 117 also up. Ast 44 and alt 41 so little lower now. Cbc not run and inr 1.1. Meld using the labs was 7 and meld na 7 so remains low.  Pt says he has high sugar and being watched.  June 2022 labs show that the CBC was clumped and so not able to be run.  June 6 ultrasound shows liver to be mildly coarsened with regards to the echotexture but with no lesions. Bile duct showed no dilated bile ducts and common duct was 3 mm. Gallbladder was normal. Pancreas was obscured by overlying structures. Right kidney 12.3 cm and left kidney 13.6 cm.  No hydronephrosis. Spleen enlarged at 16.7 cm. Liver vessels patent. Prior scan was an MRI from November 30 of last year that showed that the liver had mild morphologic changes of chronic liver disease with fissural widening and minimal nodular contour and no suspicious lesions.  Spleen was enlarged and also at 16 cm.  Small esophageal and perigastric varices were seen.   March 28, 2022 labs show IgM elevated slightly up to 247 with normal from 20 up to 172.  INR 1.1.  White blood cell count 4.4 hemoglobin 15.7 plate count 79 MCV 94 neutrophils 2.8 lymphocytes 0.9 glucose 91 BUN is 16 creatinine 0.73 sodium 138 potassium 4.5 albumin 4.3 bilirubin 0.7 alkaline phosphatase 83 AST 46 and ALT 48. Meld 7  and meld na 7.  He did the covid 19 booster last year he thinks 10-7-21 and has not done the 2nd booster.  January 17, 2022 labs show white blood cell count 4.9 hemoglobin 16.5 hematocrit 45.5 platelet count low at 72.  MCV normal at 92.  Neutrophils 2.9 lymphocytes 1.2.  Pt normally bp 127/90 range. He says not on meds for bp at this time. Maybe see primary to start a low dose beta blocker like carvedilol. That was recommended by Dr Terry as blood thinner and the varices.  Nov 17 2022 egd 3 columns grade 2 ev and no bleeding and incompletely eradicated with banding. SHe recomended egd in 2m.  EGD done by  on April 11, 2022 at [facility] shows 2 columns of grade 1 varices and one Grade 2 varices in the lower third of the esophagus.  Single 7 mm sessile polyp with no bleeding and no stigmata of recent bleeding seen at the cardia. It was removed with hot snare.  It was retrieved.  Two  6 to 10 mm sessile polyps with no bleeding seen on the lesser curvature of the stomach and removed with hot snare.  Mild inflammation characterized by the erythema and granularity seen of the gastric antrum duodenum normal.  Pathology of this came back as gastric polyps x3 with cauterized gastric mucosa with regenerative epithelial changes and cystic dilation with suggestive hyperplastic polyps.    January 25, 2022 EGD showed 2 columns grade 1 varices in 1: Grade 2 varices and diffuse moderate inflammation characterized by congestion erythema granularity of the gastric antrum.  Multiple 2 to 10 mm polyp seen with no bleeding.  January 25, 2022 colonoscopy showed entire colon was normal with nonbleeding rectal varices.  A single subcentimeter condyloma was seen.  Internal hemorrhoids not bleeding was seen at the endoscopy.  There were small in nature.  Patient was to be referred to colorectal surgeon.  He did see april 19 the rectal surgeon and they did a treatment for a condyloma.  November 30, 2021 MRI showed at [facility] lower thorax normal.  Liver with no fat or iron but with mild morphologic changes chronic liver disease with fissural widening and minimal nodular contour and no suspicious lesions.  Splenomegaly to 16 cm seen.  Gallbladder/biliary tree normal.  Pancreas normal.  Kidneys normal.  Liver vessels patent.  Small esophageal and perigastric varices seen.    PT Jan 2022 seen by Ms Jessenia MANCILLA for TH.  he is on mvi gummies which has 2000% biotin in it. would recommend he avoid. Also on mobic for a year or so, recommend he avoid this as well.   labs 1/3/22: na 138 alt 41 ast 42 alp 77tb 0.7 wbc 4.5 hgb 16.2 plt low 75      RECAP: He has a hx of HIV diagnosed in 1986- treatment initiated in 1996 and follows with Jeffery Hawk PA-C. and Dr Matthew.  Currently on Biktarvy since 01/2020.  Prezcobix was recently discontinued about 2 months ago as concern this was contributing to thrombocytopenia.   He has been seeing hematology (Dr. Yvette Hughes) for Thrombocytopenia: ranges from 139K in 1/28/2013 to 114K in 1/2018 when stabilized and dropped to the 90's in February 2020. Concern of drug related thrombocytopenia with Biktarvy and Prezcobix. CD4 is below 200 and so on Atovaquone (Mepron) for PCP prophylaxis.    MRI 12/2/2021 Liver: No fat or iron. Mild morphologic changes of chronic liver disease with fissural widening and minimal nodular contour. No suspicious lesions.   Patent portal vein, with sequela of portal hypertension, including splenomegaly and small upper abdominal varices.  No personal hx of fatty liver, hepatitis, or other liver disease.  No family hx of liver disease.  Records indicate hep c and hep b negative 2014.  Hep B core+ as of 11/2021 but otherwise hepatitis work up negative.  LFTs in [place] reviewed from 11/2021 and WNL (alt 39, alp 76, ast 37) as are his Fe+ studies. Plt 85 11/15/21.   INR 1.08 10/2021.    Hx of hld currently on pravastatin 2017.   No hx of DM and last a1c 5.4%.  Weight has been fairly stable at about 170-175 over past 6 years, was up to max of 210 in late 1990s.  Summer 2022 175 and Winter 2022 to 170.  No alcohol use.    Marijuana use daily.  Some reports of this causing inc lfts and inc fibrosis. Still doing that.  Denies other drug or tobacco use.  Denies use of otc herbals/supplements.  He has a hx of osteoporosis with last DEXA November 2021 improved, s/p 3 Reclast infusions, follows with Dr. Namita Long endocrinology. He says not seen then in a while and he will check in.  Seem by Viry Serrano PA-C, 12/8/21 for fecal occult +. 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 Denies melena, hematochezia, N/V. Notes abdominal gurgling for years with ARVs without triggers. No anorexia or weight loss. Denies GERD and dysphagia.  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 No prior EGD.    Plan: 1.  Pt and I reviewed the tests so far. 2.  Pt wants to know if he can get the tips done. 3.  Pt will look to get the egd done and meet up with Dr Thakkar. 4.   He will check hep a vaccine. 5.   RTC in 2months.    Duration of the visit was 73  minutes with 40 minutes of epic chart review and prep and loading all the new tests and results and notes and labs and info from epic to [facility] prep and 33 minutes by [employer] video for this TeleMed visit with time reviewing their prior and recent records and labs and discussing their current status and future plans for care for the patient. Reid was present also.